# Patient Record
Sex: MALE | Race: WHITE
[De-identification: names, ages, dates, MRNs, and addresses within clinical notes are randomized per-mention and may not be internally consistent; named-entity substitution may affect disease eponyms.]

---

## 2017-03-01 ENCOUNTER — HOSPITAL ENCOUNTER (OUTPATIENT)
Dept: HOSPITAL 80 - FED | Age: 72
Setting detail: OBSERVATION
LOS: 1 days | Discharge: HOME HEALTH | End: 2017-03-02
Attending: INTERNAL MEDICINE | Admitting: INTERNAL MEDICINE
Payer: COMMERCIAL

## 2017-03-01 VITALS — RESPIRATION RATE: 18 BRPM

## 2017-03-01 DIAGNOSIS — F17.210: ICD-10-CM
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
## 2017-03-01 DIAGNOSIS — Z85.46: ICD-10-CM

## 2017-03-01 DIAGNOSIS — J44.9: ICD-10-CM

## 2017-03-01 DIAGNOSIS — Z91.5: ICD-10-CM

## 2017-03-01 DIAGNOSIS — J18.9: Primary | ICD-10-CM

## 2017-03-01 DIAGNOSIS — Z85.830: ICD-10-CM

## 2017-03-01 DIAGNOSIS — F11.21: ICD-10-CM

## 2017-03-01 DIAGNOSIS — S22.000A: ICD-10-CM

## 2017-03-01 DIAGNOSIS — F10.20: ICD-10-CM
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
## 2017-03-01 DIAGNOSIS — I10: ICD-10-CM

## 2017-03-01 DIAGNOSIS — F03.90: ICD-10-CM

## 2017-03-01 LAB
% IMMATURE GRANULYOCYTES: 0.9 % (ref 0–1.1)
ABSOLUTE IMMATURE GRANULOCYTES: 0.08 10^3/UL (ref 0–0.1)
ABSOLUTE NRBC COUNT: 0 10^3/UL (ref 0–0.01)
ADD DIFF?: NO
ADD MORPH?: NO
ADD SCAN?: NO
ANION GAP SERPL CALC-SCNC: 10 MEQ/L (ref 8–16)
ATYPICAL LYMPHOCYTE FLAG: 0 (ref 0–99)
CALCIUM SERPL-MCNC: 9.2 MG/DL (ref 8.5–10.4)
CHLORIDE SERPL-SCNC: 100 MEQ/L (ref 97–110)
CO2 SERPL-SCNC: 30 MEQ/L (ref 22–31)
CREAT SERPL-MCNC: 0.8 MG/DL (ref 0.7–1.3)
ERYTHROCYTE [DISTWIDTH] IN BLOOD BY AUTOMATED COUNT: 13.6 % (ref 11.5–15.2)
ETHANOL SERPL-MCNC: 69 MG/DL (ref 0–10)
FRAGMENT RBC FLAG: 0 (ref 0–99)
GFR SERPL CREATININE-BSD FRML MDRD: > 60 ML/MIN/{1.73_M2}
GLUCOSE SERPL-MCNC: 78 MG/DL (ref 70–100)
HCT VFR BLD CALC: 36.9 % (ref 40–51)
HGB BLD-MCNC: 12.8 G/DL (ref 13.7–17.5)
LEFT SHIFT FLG: 0 (ref 0–99)
LIPEMIA HEMOLYSIS FLAG: 90 (ref 0–99)
MCH RBC BLDCO QN: 36.1 PG (ref 27.9–34.1)
MCHC RBC AUTO-ENTMCNC: 34.7 G/DL (ref 32.4–36.7)
MCV RBC AUTO: 103.9 FL (ref 81.5–99.8)
NRBC-AUTO%: 0 % (ref 0–0.2)
PLATELET # BLD: 367 10^3/UL (ref 150–400)
PLATELET CLUMPS FLAG: 0 (ref 0–99)
PMV BLD AUTO: 9.5 FL (ref 8.7–11.7)
POTASSIUM SERPL-SCNC: 3.8 MEQ/L (ref 3.5–5.2)
RBC # BLD AUTO: 3.55 10^6/UL (ref 4.4–6.38)
SODIUM SERPL-SCNC: 140 MEQ/L (ref 134–144)
TROPONIN I SERPL-MCNC: < 0.012 NG/ML (ref 0–0.03)

## 2017-03-01 PROCEDURE — 99285 EMERGENCY DEPT VISIT HI MDM: CPT

## 2017-03-01 PROCEDURE — 70450 CT HEAD/BRAIN W/O DYE: CPT

## 2017-03-01 PROCEDURE — 96365 THER/PROPH/DIAG IV INF INIT: CPT

## 2017-03-01 PROCEDURE — 93005 ELECTROCARDIOGRAM TRACING: CPT

## 2017-03-01 PROCEDURE — 96361 HYDRATE IV INFUSION ADD-ON: CPT

## 2017-03-01 PROCEDURE — 96367 TX/PROPH/DG ADDL SEQ IV INF: CPT

## 2017-03-01 PROCEDURE — G0378 HOSPITAL OBSERVATION PER HR: HCPCS

## 2017-03-01 PROCEDURE — 97165 OT EVAL LOW COMPLEX 30 MIN: CPT

## 2017-03-01 PROCEDURE — 71020: CPT

## 2017-03-01 PROCEDURE — G0480 DRUG TEST DEF 1-7 CLASSES: HCPCS

## 2017-03-01 RX ADMIN — GABAPENTIN SCH MG: 300 CAPSULE ORAL at 18:16

## 2017-03-01 RX ADMIN — IPRATROPIUM BROMIDE AND ALBUTEROL SCH: 20; 100 SPRAY, METERED RESPIRATORY (INHALATION) at 22:19

## 2017-03-01 RX ADMIN — GABAPENTIN SCH MG: 300 CAPSULE ORAL at 21:58

## 2017-03-01 RX ADMIN — ALUMINUM HYDROXIDE, MAGNESIUM HYDROXIDE, DIMETHICONE PRN ML: 200; 200; 20 SUSPENSION ORAL at 22:05

## 2017-03-01 NOTE — EDPHY
H & P


Time Seen by Provider: 03/01/17 08:31


HPI/ROS: 





HPI


Fall out of bed.





71-year-old male by ambulance from the [facility] living [facility] and the [facility].  

Unwitnessed fall last night out of bed.  undetermined down time.  Patient found 

on floor.  Patient was complaining of some vague upper back pain involving both 

shoulder blades.  Denies chest pain. No palpitations.  He does not remember the 

fall.  He is thinks he hit his head but he is not sure.  He denies being on 

antiplatelet or anticoagulant medications at this time.  Denies any new 

weakness or loss of sensation in his extremities.  No changes in vision.





ROS:





Constitutional:  No fever, no chills.  No weakness.


Eyes:  No discharge.  No changes in vision.


ENT:  No sore throat.  No nasal congestion or rhinorrhea.


Respiratory:  No cough.  No shortness of breath.


Cardiac:  No chest pain, no palpitations.


Gastrointestinal:  No abdominal pain, no vomiting, no diarrhea.


Genitourinary:  No hematuria.  No dysuria or increased frequency with urination.


Musculoskeletal:  No back pain.  No neck pain. As above.  No extremity pain.


Skin:  No rashes.


Neurological:  No headache.  No focal weakness or altered sensation.





Past medical history:  Chronic respiratory disease, chronic pain, history of 

bone cancer, hypertension, lifetime smoker.  Narcotic caution on previous 

medical records.





Social history:  Smoker.  Denies alcohol.





Physical Exam:





General Appearance:  Sleepy but easily arousable.  This patient is responding 

to questions appropriately and in full sentences.  This patient appears well-

hydrated and well-nourished.


Head:  Normocephalic atraumatic except for a superficial abrasion mid upper 

forehead..


Face:  Facial bones are stable on palpation.


Eyes:  Pupils equal and round and reactive to light, no pallor or injection.  

No lid erythema or edema.


ENT, Mouth:  Mucous membranes dry.  Dentition is intact.  No malocclusion of 

the jaw.  No tongue lacerations or abrasions.  Pharynx is clear.  The bilateral 

nasal canals are clear.  No septal hematoma.


Respiratory:  There are no retractions, lungs are clear to auscultation with 

good air movement bilaterally.  Chest wall is stable to AP and lateral 

palpation.


Cardiovascular:  Regular rate and rhythm.  No murmur.


Gastrointestinal:  Abdomen is soft and nontender, no masses, bowel sounds 

normal.


Neurological:  Motor sensory function is intact.  Cranial nerves are normal.  

Cerebellar function intact.


Skin:  Warm and dry, no rashes.  No lacerations, abrasions or contusions.


Musculoskeletal:  Neck is supple and nontender.  The trachea is midline. 

Rightward scoliosis noted.  No midline cervical, thoracic, lumbar or sacral 

tenderness on palpation.  No flank tenderness on palpation.


Extremities are symmetrical, full range of motion.  All joints in the bilateral 

upper and bilateral lower extremities range without pain or impingement.  No 

tenderness on palpation of the long bones in the bilateral upper and bilateral 

lower extremities.


Psychiatric:  No agitation.  No depression.





Database:





EKG:





EKG time is 8:46 a.m.; EKG shows a narrow complex normal sinus rhythm with a 

ventricular rate of 61.  The MN, QRS, QT intervals are within normal limits.  

There are no ST-T wave changes indicative of ischemic or injury pattern.  No 

evidence of right heart strain.  Interpreted by me.





Imaging:





Chest x-ray PA and lateral; the cardiac mediastinal silhouette is unremarkable. 

Left-sided basilar pneumonia versus atelectasis.  New mid thoracic compression 

fractures since November of 2016.  No evidence pneumothorax.  No other acute 

cardiopulmonary disease process noted.  Interpreted by me.





CT scan of head without contrast; atrophy.  No acute pathology.  Results were 

discussed with staff radiologist.





Procedures:





Emergency department course:





IV was placed.  He was placed on a cardiac monitor.  EKG was performed and 

reviewed by myself.  He will be sent for CT imaging of his head.  Blood work 

including CK will be checked.  Patient will be hydrated with a L of IV normal 

saline initially.  Vital signs reviewed and are normal.





11:15 a.m., discussed results of chest x-ray with the patient and his nurses.  

Will treat for possible pneumonia.  Blood cultures drawn.  Patient given IV 

ceftriaxone and IV azithromycin.  He does not meet SIRS criteria and sepsis.





11:35 a.m., spoke with hospitalist.  Case discussed in detail.  Patient 

accepted for admission under Dr. Mensah.





Differential Diagnosis:





The differential diagnosis on this patient includes but is not limited to 

dehydration, opiate pain medication abuse, alcohol intoxication, rhabdomyolysis

, head injury.  This represents a partial list of diagnoses considered.  These 

considerations are based on history, physical exam, past history, reassessment 

and diagnostic testing.


Smoking Status: Current every day smoker


Constitutional: 


 Initial Vital Signs











Temperature (C)  36.4 C   03/01/17 08:28


 


Heart Rate  76   03/01/17 08:28


 


Respiratory Rate  18   03/01/17 08:28


 


Blood Pressure  159/76 H  03/01/17 08:28


 


O2 Sat (%)  94   03/01/17 08:28








 











O2 Delivery Mode               Nasal Cannula


 


O2 (L/minute)                  4














Allergies/Adverse Reactions: 


 





Sulfa (Sulfonamide Antibiotics) [Sulfa(Sulfonamide Antibiotics)] Allergy (

Verified 11/10/16 13:15)


 








Home Medications: 














 Medication  Instructions  Recorded


 


Albuterol [Proventil Inhaler HFA 2 puffs IH Q4H PRN 03/01/17





(*)]  


 


Calcium Carbonate [Tums 500MG (*)] 500 mg PO PRN PRN 03/01/17


 


Cyclobenzaprine [Flexeril 10 MG 10 mg PO TID PRN 03/01/17





(*)]  


 


DULoxetine [Cymbalta 60 MG (*)] 60 mg PO DAILY 03/01/17


 


Gabapentin [Neurontin 300 MG (*)] 300 mg PO TID 03/01/17


 


Naproxen Sodium [Aleve 220 MG (*)] 220 mg PO QID PRN 03/01/17


 


Omeprazole 40 mg PO DAILY 03/01/17


 


Temazepam [Restoril 15 MG (*)] 30 mg PO HS PRN 03/01/17














Medical Decision Making





- Data Points


Laboratory Results: 


 Laboratory Results





 03/01/17 08:50 





 03/01/17 08:50 








Medications Given: 


 








Discontinued Medications





Albuterol/Ipratropium (Duoneb)  3 ml IH QID GREGG


   Stop: 08/28/17 15:59


   Last Admin: 03/01/17 16:32 Dose:  Not Given


Sodium Chloride (Ns)  1,000 mls @ 0 mls/hr IV EDNOW ONE


   PRN Reason: Wide Open


   Stop: 03/01/17 08:40


   Last Admin: 03/01/17 08:50 Dose:  1,000 mls


Sodium Chloride (Ns)  1,000 mls @ 0 mls/hr IV ONCE ONE


   PRN Reason: Wide Open


   Stop: 03/01/17 09:35


   Last Admin: 03/01/17 09:45 Dose:  1,000 mls


Azithromycin 500 mg/ Dextrose  255 mls @ 255 mls/hr IV EDNOW ONE


   PRN Reason: Protocol


   Stop: 03/01/17 12:16


   Last Admin: 03/01/17 12:03 Dose:  255 mls


Ceftriaxone Sodium/Dextrose (Rocephin 1 Gm (Premix))  50 mls @ 100 mls/hr IV 

EDNOW ONE


   PRN Reason: Protocol


   Stop: 03/01/17 11:46


   Last Admin: 03/01/17 11:33 Dose:  50 mls








Departure





- Departure


Disposition: [facility]


Clinical Impression: 


 Dehydration, History of fall, Pneumonia, Thoracic compression fracture, 

Polysubstance abuse

## 2017-03-01 NOTE — GHP
DATE OF ADMISSION:  03/01/2017



CHIEF COMPLAINT:  "I do not know why I am here."



HISTORY:  This is a 71-year-old man with past medical history of chronic alcohol abuse as well as pr
evious chronic narcotic and benzodiazepine dependency and COPD who presents with a history of having
 fallen out of bed at his assisted living facility.  Apparently he was found on the floor.  At the t
oralia my evaluation, patient has no recollection of falling.  He states he has no idea how he ended up
 here in the hospital.  He notes this is normal for him, as his memory has been getting worse recent
ly.  He was found to have evidence of pneumonia while in the ER and is being admitted for treatment 
of pneumonia, which was explained to him.  He, at this time, other than confusion as to why he is he
re, has no complaints.



PAST MEDICAL HISTORY:  Includes:

1.  Chronic alcohol abuse.

2.  Chronic narcotic and benzodiazepine use, which is now no longer occurring.

3.  COPD.

4.  History of prostate cancer with bony metastases.

5.  Prior aspiration pneumonia.

6.  Prior depression with suicide attempts.

7.  Cervical spinal stenosis.



FAMILY HISTORY:  This was reviewed, and per chart review, includes a cousin with prostate cancer.



SOCIAL HISTORY:  The patient lives at [facility].  He denies having any family i
[facility], but does have an MD POA listed named Ed.  Has remote tobacco use, and notes he is currently 
using alcohol.  For that reason, he was taken off his narcotics and benzos.



ADVANCED DIRECTIVE:  Last advanced directive in our system states that he is full code, but would li
ke care withdrawn should he fail to progress.  He does have a palliative care note, however, subsequ
ent to that, saying that he is DNR.  This is clearly documented, and was verified by his MD POA, and
 this will be documented as his goals of care.



REVIEW OF SYSTEMS:  This is unobtainable secondary to patient's mental status, though he denies any 
complaints.



MEDICATIONS:  Include:

1.  Calcium carbonate.

2.  Temazepam.

3.  Omeprazole.

4.  Gabapentin.

5.  Cymbalta.

6.  Flexeril.

7.  Naproxen.

8.  Albuterol.



ALLERGIES:  Include sulfa.



PHYSICAL EXAMINATION:  VITAL SIGNS:  /76, heart rate 68, respiratory rate 20, O2 sats 95% on 4
 L.  Temperature is 36.8.  GENERAL APPEARANCE:  This is a chronically ill-appearing male, appears ol
kurtis than stated age.  He is awake and alert.  No acute distress.  EYES:  Anicteric.  HEENT:  Poor de
ntition, oropharynx clear. 

CARDIOVASCULAR:  Regular rate and rhythm, no MRG.  PULMONARY:  Decreased breath sounds throughout.  
He does have left basilar crackles.  ABDOMEN:  Soft, nontender, positive bowel sounds.  EXTREMITIES:
  No clubbing, cyanosis, or edema.  SKIN:  Warm, dry, well perfused.  NEURO/PSYCH:  Patient has sign
ificant cognitive deficits and very poor memory, but is alert and appropriate.



CLINICAL DATA:  Labs reviewed, significant for white blood cell count of 8.9, hematocrit 36.9, plate
lets of 367.  Chemistry is unremarkable.  Troponin is less than 0.012. 



Chest x-ray shows left basilar pneumonia versus left basilar atelectasis.  This was personally revie
wed. 



Head CT is negative for any acute findings.



ASSESSMENT/PLAN:  This is a 71-year-old man with chronic alcohol abuse as well as chronic obstructiv
e pulmonary disease who presents with unwitnessed fall and presumed pneumonia.

1.  Pneumonia.  The patient is a very poor historian, though he does have mild hypoxia and evidence 
of a left-sided infiltrate on chest x-ray, which is atelectasis versus pneumonia.  He has been start
ed on ceftriaxone and azithromycin.  We will transition him to oral levofloxacin tomorrow given that
 I suspect this is a relatively mild pneumonia.  Will check influenza swab.  

2.  Acute hypoxic respiratory failure.  The patient is requiring 4 L of O2 to maintain oxygen satura
tions in the low 90s.  He does have underlying chronic obstructive pulmonary disease, but no clear e
vidence of acute exacerbation.  It sounds as if his mobility is quite limited at baseline, atelectas
is undoubtedly contributing.  Ambulate t.i.d., IS, p.r.n. albuterol nebs.  Suspect some of this is l
ikely chronic.

3.  Chronic alcohol abuse.  Apparently patient recently had his narcotic contract severed with his [facility] management doctor due to his ongoing alcohol abuse.  I did discuss with him if he intends to patsy
t drinking, and he was able to state that he is not.  He is definitely at risk for withdrawal, has h
ad withdrawal in the past.  We will start p.r.n. vodka and monitor for signs or symptoms of withdraw
al.  We will provide multivitamin, thiamine, and folate.

4.  History of prostate cancer.  Some discussion in the chart whether or not this was ever an accura
te diagnosis, but apparently he has had history of prostate cancer with bony metastases.  At any rat
e, it does not appear that he is currently undergoing treatment, and this is not contributing to his
 acute presentation.  

5.  Code status.  The patient is too confused to have this conversation.  On chart review, there are
 a couple of contradictory notes, although the most recent note stated that he should be DNR, and th
is was confirmed by his MD ALLISON, Ed.  Will ask Palliative to get involved, as there are some notes al
so on the chart from the patient that sound as if he does not want any medical care and query if he 
should be readmitted to the hospital in the future.

6.  Patient is new to my care.  Old records reviewed.  Summary is as per HPI and past medical histor
y.  Care plan reviewed with ER physician, including plans for treatment of pneumonia.





Job #:  [id number]

## 2017-03-01 NOTE — CPEKG
Heart Rate: 61

RR Interval: 984

P-R Interval: 176

QRSD Interval: 92

QT Interval: 492

QTC Interval: 496

P Axis: 3

QRS Axis: 6

T Wave Axis: 0

EKG Severity - BORDERLINE ECG -

EKG Impression: SINUS RHYTHM

EKG Impression: BORDERLINE PROLONGED QT INTERVAL

Electronically Signed By: McCollester, Laughlin 01-Mar-2017 15:00:12

## 2017-03-02 VITALS
TEMPERATURE: 99 F | OXYGEN SATURATION: 94 % | DIASTOLIC BLOOD PRESSURE: 80 MMHG | HEART RATE: 95 BPM | SYSTOLIC BLOOD PRESSURE: 129 MMHG

## 2017-03-02 LAB
% IMMATURE GRANULYOCYTES: 0.6 % (ref 0–1.1)
ABSOLUTE IMMATURE GRANULOCYTES: 0.06 10^3/UL (ref 0–0.1)
ABSOLUTE NRBC COUNT: 0 10^3/UL (ref 0–0.01)
ADD DIFF?: NO
ADD MORPH?: NO
ADD SCAN?: NO
ANION GAP SERPL CALC-SCNC: 6 MEQ/L (ref 8–16)
ATYPICAL LYMPHOCYTE FLAG: 10 (ref 0–99)
CALCIUM SERPL-MCNC: 7.5 MG/DL (ref 8.5–10.4)
CHLORIDE SERPL-SCNC: 111 MEQ/L (ref 97–110)
CO2 SERPL-SCNC: 26 MEQ/L (ref 22–31)
CREAT SERPL-MCNC: 0.6 MG/DL (ref 0.7–1.3)
ERYTHROCYTE [DISTWIDTH] IN BLOOD BY AUTOMATED COUNT: 14.1 % (ref 11.5–15.2)
FRAGMENT RBC FLAG: 0 (ref 0–99)
GFR SERPL CREATININE-BSD FRML MDRD: > 60 ML/MIN/{1.73_M2}
GLUCOSE SERPL-MCNC: 85 MG/DL (ref 70–100)
HCT VFR BLD CALC: 29.5 % (ref 40–51)
HGB BLD-MCNC: 10.3 G/DL (ref 13.7–17.5)
LEFT SHIFT FLG: 0 (ref 0–99)
LIPEMIA HEMOLYSIS FLAG: 90 (ref 0–99)
MAGNESIUM SERPL-MCNC: 1.6 MG/DL (ref 1.6–2.3)
MCH RBC BLDCO QN: 36.4 PG (ref 27.9–34.1)
MCHC RBC AUTO-ENTMCNC: 34.9 G/DL (ref 32.4–36.7)
MCV RBC AUTO: 104.2 FL (ref 81.5–99.8)
NRBC-AUTO%: 0 % (ref 0–0.2)
PLATELET # BLD: 291 10^3/UL (ref 150–400)
PLATELET CLUMPS FLAG: 0 (ref 0–99)
PMV BLD AUTO: 9.2 FL (ref 8.7–11.7)
POTASSIUM SERPL-SCNC: 3.4 MEQ/L (ref 3.5–5.2)
RBC # BLD AUTO: 2.83 10^6/UL (ref 4.4–6.38)
SODIUM SERPL-SCNC: 143 MEQ/L (ref 134–144)

## 2017-03-02 RX ADMIN — IPRATROPIUM BROMIDE AND ALBUTEROL SCH: 20; 100 SPRAY, METERED RESPIRATORY (INHALATION) at 06:43

## 2017-03-02 RX ADMIN — IPRATROPIUM BROMIDE AND ALBUTEROL SCH: 20; 100 SPRAY, METERED RESPIRATORY (INHALATION) at 13:27

## 2017-03-02 RX ADMIN — ALUMINUM HYDROXIDE, MAGNESIUM HYDROXIDE, DIMETHICONE PRN ML: 200; 200; 20 SUSPENSION ORAL at 13:42

## 2017-03-02 RX ADMIN — GABAPENTIN SCH MG: 300 CAPSULE ORAL at 10:32

## 2017-03-09 ENCOUNTER — HOSPITAL ENCOUNTER (INPATIENT)
Dept: HOSPITAL 80 - FED | Age: 72
LOS: 2 days | Discharge: HOME | DRG: 177 | End: 2017-03-11
Attending: INTERNAL MEDICINE | Admitting: INTERNAL MEDICINE
Payer: COMMERCIAL

## 2017-03-09 DIAGNOSIS — S22.050A: ICD-10-CM

## 2017-03-09 DIAGNOSIS — G92: ICD-10-CM

## 2017-03-09 DIAGNOSIS — J96.21: ICD-10-CM

## 2017-03-09 DIAGNOSIS — I10: ICD-10-CM
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
## 2017-03-09 DIAGNOSIS — F10.10: ICD-10-CM

## 2017-03-09 DIAGNOSIS — F13.10: ICD-10-CM

## 2017-03-09 DIAGNOSIS — S22.42XA: ICD-10-CM

## 2017-03-09 DIAGNOSIS — I71.2: ICD-10-CM

## 2017-03-09 DIAGNOSIS — C79.51: ICD-10-CM

## 2017-03-09 DIAGNOSIS — Z66: ICD-10-CM

## 2017-03-09 DIAGNOSIS — R62.7: ICD-10-CM

## 2017-03-09 DIAGNOSIS — J44.1: ICD-10-CM

## 2017-03-09 DIAGNOSIS — J69.0: Primary | ICD-10-CM

## 2017-03-09 DIAGNOSIS — F11.20: ICD-10-CM

## 2017-03-09 DIAGNOSIS — M48.02: ICD-10-CM

## 2017-03-09 DIAGNOSIS — F17.210: ICD-10-CM

## 2017-03-09 LAB
% IMMATURE GRANULYOCYTES: 0.2 % (ref 0–1.1)
ABSOLUTE IMMATURE GRANULOCYTES: 0.02 10^3/UL (ref 0–0.1)
ABSOLUTE NRBC COUNT: 0 10^3/UL (ref 0–0.01)
ADD DIFF?: NO
ADD MORPH?: NO
ADD SCAN?: NO
ALBUMIN SERPL-MCNC: 2.9 G/DL (ref 3.5–5)
ALP SERPL-CCNC: 115 IU/L (ref 38–126)
ALT SERPL-CCNC: 26 IU/L (ref 21–72)
ANION GAP SERPL CALC-SCNC: 8 MEQ/L (ref 8–16)
APTT BLD: 33 SEC (ref 23–38)
AST SERPL-CCNC: 25 IU/L (ref 17–59)
ATYPICAL LYMPHOCYTE FLAG: 0 (ref 0–99)
BILIRUB SERPL-MCNC: 0.7 MG/DL (ref 0.1–1.4)
BILIRUBIN-CONJUGATED: 0.4 MG/DL (ref 0–0.5)
BILIRUBIN-UNCONJUGATED: 0.3 MG/DL (ref 0–1.1)
CALCIUM SERPL-MCNC: 9.3 MG/DL (ref 8.5–10.4)
CHLORIDE SERPL-SCNC: 105 MEQ/L (ref 97–110)
CO2 SERPL-SCNC: 28 MEQ/L (ref 22–31)
CREAT SERPL-MCNC: 0.8 MG/DL (ref 0.7–1.3)
CREATINE KINASE-MB FRACTION: 1.76 NG/ML (ref 0–3.19)
ERYTHROCYTE [DISTWIDTH] IN BLOOD BY AUTOMATED COUNT: 13.8 % (ref 11.5–15.2)
ETHANOL SERPL-MCNC: 32 MG/DL (ref 0–10)
FRAGMENT RBC FLAG: 0 (ref 0–99)
GFR SERPL CREATININE-BSD FRML MDRD: > 60 ML/MIN/{1.73_M2}
GLUCOSE SERPL-MCNC: 82 MG/DL (ref 70–100)
HCT VFR BLD CALC: 31.1 % (ref 40–51)
HGB BLD-MCNC: 10.8 G/DL (ref 13.7–17.5)
INR PPP: 1.27 (ref 0.83–1.16)
LEFT SHIFT FLG: 0 (ref 0–99)
LIPEMIA HEMOLYSIS FLAG: 90 (ref 0–99)
MAGNESIUM SERPL-MCNC: 1.6 MG/DL (ref 1.6–2.3)
MAGNESIUM SERPL-MCNC: 1.6 MG/DL (ref 1.6–2.3)
MCH RBC BLDCO QN: 35.9 PG (ref 27.9–34.1)
MCHC RBC AUTO-ENTMCNC: 34.7 G/DL (ref 32.4–36.7)
MCV RBC AUTO: 103.3 FL (ref 81.5–99.8)
NRBC-AUTO%: 0 % (ref 0–0.2)
PLATELET # BLD: 261 10^3/UL (ref 150–400)
PLATELET CLUMPS FLAG: 0 (ref 0–99)
PMV BLD AUTO: 9.9 FL (ref 8.7–11.7)
POTASSIUM SERPL-SCNC: 3.4 MEQ/L (ref 3.5–5.2)
POTASSIUM SERPL-SCNC: 3.6 MEQ/L (ref 3.5–5.2)
POTASSIUM SERPL-SCNC: 3.7 MEQ/L (ref 3.5–5.2)
PROT SERPL-MCNC: 6 G/DL (ref 6.3–8.2)
PROTHROMBIN TIME: 15.9 SEC (ref 12–15)
RBC # BLD AUTO: 3.01 10^6/UL (ref 4.4–6.38)
SODIUM SERPL-SCNC: 141 MEQ/L (ref 134–144)
TROPONIN I SERPL-MCNC: < 0.012 NG/ML (ref 0–0.03)

## 2017-03-09 PROCEDURE — G0480 DRUG TEST DEF 1-7 CLASSES: HCPCS

## 2017-03-09 RX ADMIN — GABAPENTIN SCH MG: 300 CAPSULE ORAL at 14:26

## 2017-03-09 RX ADMIN — GABAPENTIN SCH MG: 300 CAPSULE ORAL at 22:15

## 2017-03-09 RX ADMIN — TAZOBACTAM SODIUM AND PIPERACILLIN SODIUM SCH MLS: 375; 3 INJECTION, SOLUTION INTRAVENOUS at 12:44

## 2017-03-09 RX ADMIN — CYCLOBENZAPRINE HYDROCHLORIDE PRN MG: 10 TABLET, FILM COATED ORAL at 14:26

## 2017-03-09 RX ADMIN — ACETAMINOPHEN PRN MG: 325 TABLET ORAL at 19:18

## 2017-03-09 RX ADMIN — TAZOBACTAM SODIUM AND PIPERACILLIN SODIUM SCH MLS: 375; 3 INJECTION, SOLUTION INTRAVENOUS at 18:03

## 2017-03-09 RX ADMIN — ENOXAPARIN SODIUM SCH MG: 100 INJECTION SUBCUTANEOUS at 10:05

## 2017-03-09 NOTE — HOSPPROG
Hospitalist Progress Note


Assessment/Plan: 





H&P reviewed,  patient seen a examined.    Agree with plan as per Dr. Fraga.


I will follow-up in am.








Objective: 


 Vital Signs











Temp Pulse Resp BP Pulse Ox


 


 36.6 C   61   16   149/81 H  96 


 


 03/09/17 13:48  03/09/17 13:48  03/09/17 13:48  03/09/17 13:48  03/09/17 13:48








 











 03/08/17 03/09/17 03/10/17





 05:59 05:59 05:59


 


Intake Total   500


 


Balance   500








 











PT  15.9 SEC (12.0-15.0)  H  03/09/17  03:30    


 


INR  1.27  (0.83-1.16)  H  03/09/17  03:30    














ICD10 Worksheet


Patient Problems: 


 Problems











Problem Status Onset


 


Failure to thrive Acute  


 


Generalized weakness Acute  


 


Recurrent falls Acute  


 


Acute encephalopathy Acute  


 


Acute hyperactive alcohol withdrawal delirium Acute  


 


Altered mental status Acute  


 


Aspiration into airway Acute  


 


Chronic pain Acute  


 


Dehydration Acute  


 


History of fall Acute  


 


Opiate dependence, continuous Acute  


 


Palliative care encounter Acute  


 


Pancreatitis Acute  


 


Pneumonia Acute  


 


Pneumonia Acute  


 


Polypharmacy Acute  


 


Polysubstance abuse Acute  


 


Polysubstance dependence including opioid drug with daily use Acute  


 


Rib fractures Acute  


 


Thoracic compression fracture Acute

## 2017-03-09 NOTE — GHP
[f 
rep st]



                                                            HISTORY AND PHYSICAL





DATE OF ADMISSION:  03/09/2017



CHIEF COMPLAINT:  Falls.



HISTORY OF PRESENT ILLNESS:  This is a 71-year-old man with a history of 
longstanding alcohol abuse as well as chronic narcotic and benzodiazepine 
dependency and dementia, who presents status post multiple falls at home.  
Apparently, the fire department had to come to his house several times in order 
to help him off the ground.  The fire department ultimately became concerned 
about his well being given horrific disarray of his living situation, and 
contacted EMS to have him brought to the emergency room.  He was recently seen 
by myself in this hospital with a left lower lobe pneumonia, and at that time 
despite concerns that he was having failure to thrive, he felt very strongly 
that he wanted to go home to his cat and was not willing to consider other 
options such as SNF.  At the time of my evaluation, patient is very somnolent 
and only minimally communicative.  He does note that he feels weak and has pain 
in his hips and right flank.  He really is unable to provide any other history 
secondary to his somnolence.



PAST MEDICAL HISTORY:  

1.  Includes chronic alcohol abuse.

2.  Chronic narcotic and benzodiazepine dependency.

3.  Recent hospitalization for left lower lobe pneumonia.

4.  Dementia.

5.  History of prostate cancer with bony metastases.

6.  COPD.

7.  History of aspiration pneumonia.

8.  Prior issues of depression and suicide attempt.

9.  Cervical spine stenosis.



FAMILY HISTORY:  Cousin with prostate cancer.



SOCIAL HISTORY:  The patient has been residing at [facility].  He does have a cat and per EMS, there is cat feces and urine all over 
his home.  He is a daily heavy drinker.  Prior tobacco use.



REVIEW OF SYSTEMS:  A 10-point review of systems obtained.  Negative except as 
per HPI.



HOME MEDICATIONS:  

1.  Thiamine.

2.  Restoril.

3.  Omeprazole.

4.  Naproxen.

5.  Multivitamin.

6.  Gabapentin.

7.  Cymbalta.

8.  Flexeril.

9.  Tums.

10.  Albuterol.

11.  Tylenol.



ALLERGIES:  Sulfa.



PHYSICAL EXAM:  /73, heart rate 57, respiratory rate 20, O2 sats 92% on 5 
L, temperature is 36.5.  GENERAL APPEARANCE:  The patient is chronically ill 
appearing.  He is cachectic, he is somnolent, but arousable.  EYES:  Anicteric.
  HEENT:  Oropharynx clear, poor dentition.  Regular rate and rhythm, no MRG.  
CTA bilaterally to anterior exam.  Decreased breath sounds throughout.  ABDOMEN
:  Soft, nontender.  Positive bowel sounds.  EXTREMITIES:  No clubbing, cyanosis
, or edema.  SKIN:  Warm, dry, well perfused.  NEURO/PSYCH:  Again, patient is 
quite somnolent but arousable.  He moves all 4 extremities.



CLINICAL DATA:  Labs reviewed.  Significant for white blood cell count of 8.8, 
hematocrit of 31.1, platelets of 261.  INR is 1.2.  Chemistry significant for a 
potassium of 3.4, creatinine is 0.8.  Troponin less than 0.012.  LFTs are 
unremarkable. 



Chest x-ray shows persistent basilar consolidation with possible new left mid 
lung zone pneumonia. 

Pelvis x-ray personally reviewed and interpreted, showing nothing acute. 



Chest CT, personally reviewed and interpreted, showing multiple rib fractures 
that appear to be subacute in the left 4th through 11th ribs.  Moderate T6 
compression fracture and increasing versus new infiltrates in the lingular 
right middle lobe and right and left lower lobes with small ground glass 
opacity in the anterior left upper lobe, possible aspiration pneumonia.  There 
is also evidence of cardiomegaly and a stable 4.1 cm ascending thoracic aortic 
aneurysm. 



Head CT:  No acute intracranial abnormalities. 



EKG, personally reviewed and interpreted, shows sinus rhythm without acute 
ischemic changes.



ASSESSMENT/PLAN:  This is a 71-year-old man with a past medical history of 
chronic alcohol abuse, dementia and failure to thrive, presenting status post 
multiple falls at home with multiple rib fractures and worsening bilateral 
pneumonia.

1.  Pneumonia.  This is in the setting of recent hospitalization and multiple 
rib fractures.  The patient is at high risk for aspiration given his chronic 
alcoholism and history of prior aspiration pneumonia.  He had recently been 
treated with a course of levofloxacin, though uncertain if he continued to take 
this medication after discharge from hospital.  He was given vancomycin and 
Zosyn in the emergency department which will be continued for the time being.  
Blood and sputum cultures are pending.

2.  Acute hypoxic respiratory failure, currently requiring 5 L to maintain O2 
sats in the mid 90s in the setting of above as well as acute encephalopathy.  
Treatment as per above as well as bronchodilators as needed.

3.  Acute encephalopathy.  The patient is quite somnolent, possibly related to 
medications given in the emergency department.  He does have underlying dementia
, and this also could represent delirium in the setting of acute illness.  Will 
monitor for improvement.

4.  Alcohol abuse and dependency.  At his last hospitalization several days ago
, patient was clear that he was not interested in quitting.  At this time, 
given his significant worsening, it is unclear if he is able to even make that 
kind of decision.  We will start him on CIWA protocol for now.  Blood alcohol 
level at the time of presentation was 32.  

5.  Multiple rib fractures.  Again, in the setting of frequent falling at home 
and alcoholism.  IS, ambulation three times daily.  

6.  Failure to thrive.  Again, the patient was found in a living situation that 
was noted to be likely not only unsanitary but unsafe.  He has had adult 
protective services involved in the past, and they will likely need to be 
involved again.  PT, OT and case management also will be involved during his 
hospitalization.

7.  Code status.  From prior hospitalizations, he has been noted to be DNR and 
this will be continued.

8.  Disposition:  Inpatient status.  Patient will need greater than 48 hours 
stay for evaluation and management of above.  The patient is new to my care.  
Care plan reviewed with Dr. Thorne in the Emergency Department including plans 
for antibiotics.





Job #:  [id number]

MTDD

## 2017-03-09 NOTE — CPEKG
Heart Rate: 65

RR Interval: 923

P-R Interval: 180

QRSD Interval: 88

QT Interval: 468

QTC Interval: 487

P Axis: 2

QRS Axis: 12

T Wave Axis: 0

EKG Severity - BORDERLINE ECG -

EKG Impression: SINUS RHYTHM

EKG Impression: BORDERLINE PROLONGED QT INTERVAL

Electronically Signed By: Farrukh Steiner 09-Mar-2017 07:29:26

## 2017-03-09 NOTE — EDPHY
H & P


HPI/ROS: 





HPI





CHIEF COMPLAINT:  Generalized weakness, fall





HISTORY OF PRESENT ILLNESS:  This patient is 71-year-old male, he presents 

emergency room by EMS after he had 2 separate falls today.  Fire came to give 

him a lift assist any started complaining of right lateral rib pain and pelvis 

pain.  They contacted EMS and EMS brought him to the emergency room.  He was 

recently here on March 2nd with a left lower lobe pneumonia.  He does have a 

history of dementia, recurrent falls, alcohol abuse, narcotic abuse, benzo 

abuse.  


He presents emergency room tonight stating that he feels globally weak.  He 

does complain of right lateral rib pain also complains of bilateral hip pain. 

Patient was able to ambulate to the EMS stretcher.  But appears very weak.  

Does complain of nausea no chest pain does complain of shortness of breath. 





Past Medical History:  Left lower lobe pneumonia recently diagnosed recently 

hospitalized on March 2nd, dementia, recurrent falls, alcohol abuse, history of 

narcotic benzo use,  oxygen dependent 5 L lives independently at [facility]





Past Surgical History:  No recent surgical history





Social History:  Alcohol use, lives at [facility] independent living size





Family History:Noncontributory

















ROS   


REVIEW OF SYSTEMS:


A comprehensive 10 point review of systems is otherwise negative aside from 

elements mentioned in the history of present illness.








Exam   


Constitutional  appears frail, triage nursing summary reviewed, vital signs 

reviewed, awake/alert. 


Eyes   normal conjunctivae and sclera, EOMI, PERRLA. 


HENT   normal inspection, atraumatic, moist mucus membranes, no epistaxis, neck 

supple/ no meningismus, no raccoon eyes. 


Respiratory  crackles bilateral bases,  normal breath sounds, no respiratory 

distress, no wheezing. 


Cardiovascular   rate normal, regular rhythm, no murmur, no edema, distal 

pulses normal. 


Gastrointestinal   soft, non-tender, no rebound, no guarding, normal bowel 

sounds, no distension, no pulsatile mass. 


Genitourinary   no CVA tenderness. 


Musculoskeletal  no midline vertebral tenderness, full range of motion, no calf 

swelling, no tenderness of extremities, no meningismus, good pulses, 

neurovascularly intact.


Skin   pink, warm, & dry, no rash, skin atraumatic. 


Neurologic   awake, alert and oriented x 3, AAOx3, moves all 4 extremities 

equally, motor intact, sensory intact, CN II-XII intact, normal cerebellar, 

normal vision, normal speech. 


Psychiatric   normal mood/affect. 


Heme/Lymph/Immune   no lymphadenopathy.





Differential Diagnosis:  Includes but is not limited to in a particular order:  

Worsening pneumonia, failure to thrive, dehydration, electrolyte abnormality, 

sepsis, hypoxia





Medical Decision Making:  Plan for this patient's patient had an IV established 

obtain blood work, patient will need an EKG, troponin, chest x-ray pelvis x-ray 

CT scan of his head due to fall.





Re-evaluation:








ED x-ray chest negative for evidence of acute trauma.  There is a left lower 

lobe infiltrate.  Image interpreted by myself.





ED x-ray pelvis:  No evidence of acute trauma specifically no evidence of acute 

pelvic fracture.  Image interpreted myself.





CT scan of the head without IV contrast  The results of the study are head 

without IV contrast are negative for acute traumatic injury The study was read 

by Dr. Perea.  I viewed the images myself on the PACS system.





EKG interpretation by me on record in [employer] system.  Impression time of 

EKG 3:35 a.m., this is sinus rhythm rate of 65 otherwise unremarkable EKG no 

acute ischemic changes.





0421:  re-evaluation this time this patient  is resting comfortably appears 

nontoxic.  Patient need to be admitted for generalized weakness failure to 

thrive recurrent falls.  Left lower lobe pneumonia.  I do not see any acute 

evidence of traumatic injury on his CT scan of his head chest x-ray or pelvis x-

ray.  I do have a great concern about discharging him as he has recurrent falls 

and is injuring himself he does drink alcohol he does appear frail and 

cachectic and was just recently here in the hospital.  I think the best plan 

for this patient to be admitted for generalized weakness failure to thrive.  

Hydration.  Further evaluation of the left lower lobe infiltrate with a CT scan 

of his chest.  And possible higher level of care placement at [facility].   As 

he is only in the independent living facility.


Source: Patient, EMS





- Medical/Surgical History


Hx Asthma: No


Hx Chronic Respiratory Disease: Yes


Hx Diabetes: No


Hx Cardiac Disease: Yes


Hx Renal Disease: No


Hx Cirrhosis: No


Hx Alcoholism: No


Hx HIV/AIDS: No


Hx Splenectomy or Spleen Trauma: No


Other PMH: HX of Bone CA, HTN, is a life time smoker 3/4 ppd now





- Social History


Smoking Status: Current every day smoker


Constitutional: 


 Initial Vital Signs











O2 Sat (%)  93   03/09/17 03:23








 











O2 Delivery Mode               Nasal Cannula


 


O2 (L/minute)                  5














Allergies/Adverse Reactions: 


 





Sulfa (Sulfonamide Antibiotics) [Sulfa(Sulfonamide Antibiotics)] Allergy (

Verified 11/10/16 13:15)


 








Home Medications: 














 Medication  Instructions  Recorded


 


Albuterol [Proventil Inhaler HFA 2 puffs IH Q4H PRN 03/01/17





(*)]  


 


Calcium Carbonate [Tums 500MG (*)] 500 mg PO PRN PRN 03/01/17


 


Cyclobenzaprine [Flexeril 10 MG 10 mg PO TID PRN 03/01/17





(*)]  


 


DULoxetine [Cymbalta 60 MG (*)] 60 mg PO DAILY 03/01/17


 


Gabapentin [Neurontin 300 MG (*)] 300 mg PO TID 03/01/17


 


Naproxen Sodium [Aleve 220 MG (*)] 220 mg PO QID PRN 03/01/17


 


Omeprazole 40 mg PO DAILY 03/01/17


 


Temazepam [Restoril 15 MG (*)] 30 mg PO HS PRN 03/01/17


 


Acetaminophen [Tylenol 325mg (*)] 650 mg PO Q4HRS PRN #0 tab 03/02/17


 


Multivitamins [Multivitamin (*)] 1 each PO DAILY #0 tab 03/02/17


 


Thiamine HCl [Vitamin B-1] 100 mg PO DAILY #0 tab 03/02/17


 


levOFLOXACIN [levAQUIN (*)] 750 mg PO DAILY AT 10AM #5 tab 03/02/17














Medical Decision Making





- Data Points


Laboratory Results: 


 Laboratory Results





 03/09/17 03:30 





 03/09/17 03:30 





 











  03/09/17 03/09/17 03/09/17





  03:30 03:30 03:30


 


WBC      8.86 10^3/uL 10^3/uL





     (3.80-9.50) 


 


RBC      3.01 10^6/uL L 10^6/uL





     (4.40-6.38) 


 


Hgb      10.8 g/dL L g/dL





     (13.7-17.5) 


 


Hct      31.1 % L %





     (40.0-51.0) 


 


MCV      103.3 fL H fL





     (81.5-99.8) 


 


MCH      35.9 pg H pg





     (27.9-34.1) 


 


MCHC      34.7 g/dL g/dL





     (32.4-36.7) 


 


RDW      13.8 % %





     (11.5-15.2) 


 


Plt Count      261 10^3/uL 10^3/uL





     (150-400) 


 


MPV      9.9 fL fL





     (8.7-11.7) 


 


Neut % (Auto)      64.3 % %





     (39.3-74.2) 


 


Lymph % (Auto)      24.0 % %





     (15.0-45.0) 


 


Mono % (Auto)      8.2 % %





     (4.5-13.0) 


 


Eos % (Auto)      2.3 % %





     (0.6-7.6) 


 


Baso % (Auto)      1.0 % %





     (0.3-1.7) 


 


Nucleat RBC Rel Count      0.0 % %





     (0.0-0.2) 


 


Absolute Neuts (auto)      5.69 10^3/uL 10^3/uL





     (1.70-6.50) 


 


Absolute Lymphs (auto)      2.13 10^3/uL 10^3/uL





     (1.00-3.00) 


 


Absolute Monos (auto)      0.73 10^3/uL 10^3/uL





     (0.30-0.80) 


 


Absolute Eos (auto)      0.20 10^3/uL 10^3/uL





     (0.03-0.40) 


 


Absolute Basos (auto)      0.09 10^3/uL 10^3/uL





     (0.02-0.10) 


 


Absolute Nucleated RBC      0.00 10^3/uL 10^3/uL





     (0-0.01) 


 


Immature Gran %      0.2 % %





     (0.0-1.1) 


 


Immature Gran #      0.02 10^3/uL 10^3/uL





     (0.00-0.10) 


 


PT    15.9 SEC H SEC  





    (12.0-15.0)  


 


INR    1.27  H   





    (0.83-1.16)  


 


APTT    33.0 SEC SEC  





    (23.0-38.0)  


 


Sodium  141 mEq/L mEq/L    





   (134-144)   


 


Potassium  3.4 mEq/L L mEq/L    





   (3.5-5.2)   


 


Chloride  105 mEq/L mEq/L    





   ()   


 


Carbon Dioxide  28 mEq/l mEq/l    





   (22-31)   


 


Anion Gap  8 mEq/L mEq/L    





   (8-16)   


 


BUN  4 mg/dL L mg/dL    





   (7-23)   


 


Creatinine  0.8 mg/dL mg/dL    





   (0.7-1.3)   


 


Estimated GFR  > 60     





    


 


Glucose  82 mg/dL mg/dL    





   ()   


 


Calcium  9.3 mg/dL mg/dL    





   (8.5-10.4)   


 


Magnesium  1.6 mg/dL mg/dL    





   (1.6-2.3)   


 


Total Bilirubin  0.7 mg/dL mg/dL    





   (0.1-1.4)   


 


Conjugated Bilirubin  0.4 mg/dL mg/dL    





   (0.0-0.5)   


 


Unconjugated Bilirubin  0.3 mg/dL mg/dL    





   (0.0-1.1)   


 


AST  25 IU/L IU/L    





   (17-59)   


 


ALT  26 IU/L IU/L    





   (21-72)   


 


Alkaline Phosphatase  115 IU/L IU/L    





   ()   


 


Creatine Kinase  98 IU/L IU/L    





   (0-224)   


 


CK-MB (CK-2) Fraction  1.76 ng/mL ng/mL    





   (0-3.19)   


 


Troponin I  < 0.012 ng/mL ng/mL    





   (0-0.034)   


 


NT-Pro-B Natriuret Pep  238 pg/mL H pg/mL    





   (0-125)   


 


Total Protein  6.0 g/dL L g/dL    





   (6.3-8.2)   


 


Albumin  2.9 g/dL L g/dL    





   (3.5-5.0)   


 


Lipase  56.0 IU/L IU/L    





   ()   


 


Ethyl Alcohol  32 mg/dL H mg/dL    





   (0-10)   











Medications Given: 


 








Discontinued Medications





Sodium Chloride (Ns)  500 mls @ 0 mls/hr IV ONCE ONE


   PRN Reason: As Directed


   Stop: 03/09/17 03:24


   Last Admin: 03/09/17 03:45 Dose:  500 mls








Departure





- Departure


Disposition: [facility]


Clinical Impression: 


 Generalized weakness, Recurrent falls





Failure to thrive


Qualifiers:


 Failure to thrive age range: in adult Qualified Code(s): R62.7 - Adult failure 

to thrive





Condition: Fair


Referrals: 


Patient,NotPresent [Primary Care Provider] - As per Instructions

## 2017-03-10 LAB
% IMMATURE GRANULYOCYTES: 0.4 % (ref 0–1.1)
ABSOLUTE IMMATURE GRANULOCYTES: 0.03 10^3/UL (ref 0–0.1)
ABSOLUTE NRBC COUNT: 0 10^3/UL (ref 0–0.01)
ADD DIFF?: NO
ADD MORPH?: NO
ADD SCAN?: NO
ANION GAP SERPL CALC-SCNC: 6 MEQ/L (ref 8–16)
ATYPICAL LYMPHOCYTE FLAG: 0 (ref 0–99)
CALCIUM SERPL-MCNC: 7.8 MG/DL (ref 8.5–10.4)
CHLORIDE SERPL-SCNC: 109 MEQ/L (ref 97–110)
CO2 SERPL-SCNC: 25 MEQ/L (ref 22–31)
CREAT SERPL-MCNC: 0.7 MG/DL (ref 0.7–1.3)
ERYTHROCYTE [DISTWIDTH] IN BLOOD BY AUTOMATED COUNT: 13.9 % (ref 11.5–15.2)
FRAGMENT RBC FLAG: 0 (ref 0–99)
GFR SERPL CREATININE-BSD FRML MDRD: > 60 ML/MIN/{1.73_M2}
GLUCOSE SERPL-MCNC: 83 MG/DL (ref 70–100)
HCT VFR BLD CALC: 29.8 % (ref 40–51)
HGB BLD-MCNC: 10.4 G/DL (ref 13.7–17.5)
LEFT SHIFT FLG: 0 (ref 0–99)
LIPEMIA HEMOLYSIS FLAG: 90 (ref 0–99)
MAGNESIUM SERPL-MCNC: 1.5 MG/DL (ref 1.6–2.3)
MCH RBC BLDCO QN: 36.7 PG (ref 27.9–34.1)
MCHC RBC AUTO-ENTMCNC: 34.9 G/DL (ref 32.4–36.7)
MCV RBC AUTO: 105.3 FL (ref 81.5–99.8)
NRBC-AUTO%: 0 % (ref 0–0.2)
PLATELET # BLD: 229 10^3/UL (ref 150–400)
PLATELET CLUMPS FLAG: 0 (ref 0–99)
PMV BLD AUTO: 10 FL (ref 8.7–11.7)
POTASSIUM SERPL-SCNC: 3.3 MEQ/L (ref 3.5–5.2)
POTASSIUM SERPL-SCNC: 3.9 MEQ/L (ref 3.5–5.2)
RBC # BLD AUTO: 2.83 10^6/UL (ref 4.4–6.38)
SODIUM SERPL-SCNC: 140 MEQ/L (ref 134–144)

## 2017-03-10 RX ADMIN — GABAPENTIN SCH MG: 300 CAPSULE ORAL at 21:31

## 2017-03-10 RX ADMIN — TAZOBACTAM SODIUM AND PIPERACILLIN SODIUM SCH MLS: 375; 3 INJECTION, SOLUTION INTRAVENOUS at 12:18

## 2017-03-10 RX ADMIN — AMOXICILLIN AND CLAVULANATE POTASSIUM SCH MG: 875; 125 TABLET, FILM COATED ORAL at 19:42

## 2017-03-10 RX ADMIN — TEMAZEPAM PRN MG: 15 CAPSULE ORAL at 01:07

## 2017-03-10 RX ADMIN — PANTOPRAZOLE SODIUM SCH MG: 40 TABLET, DELAYED RELEASE ORAL at 08:49

## 2017-03-10 RX ADMIN — ACETAMINOPHEN PRN MG: 325 TABLET ORAL at 19:41

## 2017-03-10 RX ADMIN — CYCLOBENZAPRINE HYDROCHLORIDE PRN MG: 10 TABLET, FILM COATED ORAL at 19:41

## 2017-03-10 RX ADMIN — TAZOBACTAM SODIUM AND PIPERACILLIN SODIUM SCH MLS: 375; 3 INJECTION, SOLUTION INTRAVENOUS at 06:36

## 2017-03-10 RX ADMIN — GABAPENTIN SCH MG: 300 CAPSULE ORAL at 08:49

## 2017-03-10 RX ADMIN — NICOTINE SCH MG: 14 PATCH TRANSDERMAL at 13:45

## 2017-03-10 RX ADMIN — TEMAZEPAM PRN MG: 15 CAPSULE ORAL at 19:41

## 2017-03-10 RX ADMIN — GABAPENTIN SCH MG: 300 CAPSULE ORAL at 17:06

## 2017-03-10 RX ADMIN — FOLIC ACID SCH MG: 1 TABLET ORAL at 08:50

## 2017-03-10 RX ADMIN — ENOXAPARIN SODIUM SCH MG: 100 INJECTION SUBCUTANEOUS at 08:50

## 2017-03-10 RX ADMIN — THERA TABS SCH EACH: TAB at 08:49

## 2017-03-10 RX ADMIN — TAZOBACTAM SODIUM AND PIPERACILLIN SODIUM SCH MLS: 375; 3 INJECTION, SOLUTION INTRAVENOUS at 01:38

## 2017-03-10 NOTE — HOSPPROG
Hospitalist Progress Note


Assessment/Plan: 





*  Aspiration pneumonia


   -change to PO Augmentin


*  COPD with chronic respiratory failure - baseline 4L


*  Etoh abuse


   -no signs of withdrawal


*  Prostate ca with yeny mets


*  Metabolic/toxic encephalopathy - improved


*  Multiple subacute rib fractures due to falls


   -unsafe living situation


   -patient now agreeable to SNF











Subjective: no new complaints.


Objective: 


 Vital Signs











Temp Pulse Resp BP Pulse Ox


 


 36.7 C   79   16   135/69 H  94 


 


 03/10/17 15:06  03/10/17 15:06  03/10/17 15:06  03/10/17 15:06  03/10/17 15:06








 Microbiology











 03/10/17 08:10  - Final





 Sputum, Expectorated 








 Laboratory Results





 03/10/17 05:30 





 03/10/17 05:30 





 











 03/09/17 03/10/17 03/11/17





 05:59 05:59 05:59


 


Intake Total  500 


 


Balance  500 








 











PT  15.9 SEC (12.0-15.0)  H  03/09/17  03:30    


 


INR  1.27  (0.83-1.16)  H  03/09/17  03:30    














- Physical Exam


Constitutional: no apparent distress, appears nourished, not in pain


Cardiovascular: regular rate and rhythym, no murmur, rub, or gallop


Respiratory: no respiratory distress, no rales or rhonchi, clear to auscultation


Gastrointestinal: normoactive bowel sounds, soft, non-tender abdomen, no 

palpable masses


Skin: no rashes or abrasions, no fluctuance, no induration


Neurologic: AAOx3, sensation intact bilaterally


Psychiatric: interacting appropriately, not anxious, not encephalopathic, 

thought process linear





ICD10 Worksheet


Patient Problems: 


 Problems











Problem Status Onset


 


Failure to thrive Acute  


 


Generalized weakness Acute  


 


Recurrent falls Acute  


 


Acute encephalopathy Acute  


 


Acute hyperactive alcohol withdrawal delirium Acute  


 


Altered mental status Acute  


 


Aspiration into airway Acute  


 


Chronic pain Acute  


 


Dehydration Acute  


 


History of fall Acute  


 


Opiate dependence, continuous Acute  


 


Palliative care encounter Acute  


 


Pancreatitis Acute  


 


Pneumonia Acute  


 


Pneumonia Acute  


 


Polypharmacy Acute  


 


Polysubstance abuse Acute  


 


Polysubstance dependence including opioid drug with daily use Acute  


 


Rib fractures Acute  


 


Thoracic compression fracture Acute

## 2017-03-11 VITALS — DIASTOLIC BLOOD PRESSURE: 73 MMHG | SYSTOLIC BLOOD PRESSURE: 143 MMHG | TEMPERATURE: 98.4 F | HEART RATE: 65 BPM

## 2017-03-11 VITALS — OXYGEN SATURATION: 93 % | RESPIRATION RATE: 18 BRPM

## 2017-03-11 LAB
% IMMATURE GRANULYOCYTES: 0.2 % (ref 0–1.1)
ABSOLUTE IMMATURE GRANULOCYTES: 0.02 10^3/UL (ref 0–0.1)
ABSOLUTE NRBC COUNT: 0 10^3/UL (ref 0–0.01)
ADD DIFF?: NO
ADD MORPH?: NO
ADD SCAN?: NO
ANION GAP SERPL CALC-SCNC: 4 MEQ/L (ref 8–16)
ATYPICAL LYMPHOCYTE FLAG: 10 (ref 0–99)
CALCIUM SERPL-MCNC: 8 MG/DL (ref 8.5–10.4)
CHLORIDE SERPL-SCNC: 109 MEQ/L (ref 97–110)
CO2 SERPL-SCNC: 28 MEQ/L (ref 22–31)
CREAT SERPL-MCNC: 0.6 MG/DL (ref 0.7–1.3)
ERYTHROCYTE [DISTWIDTH] IN BLOOD BY AUTOMATED COUNT: 14.1 % (ref 11.5–15.2)
FRAGMENT RBC FLAG: 0 (ref 0–99)
GFR SERPL CREATININE-BSD FRML MDRD: > 60 ML/MIN/{1.73_M2}
GLUCOSE SERPL-MCNC: 110 MG/DL (ref 70–100)
HCT VFR BLD CALC: 29.8 % (ref 40–51)
HGB BLD-MCNC: 10.3 G/DL (ref 13.7–17.5)
LEFT SHIFT FLG: 0 (ref 0–99)
LIPEMIA HEMOLYSIS FLAG: 90 (ref 0–99)
MAGNESIUM SERPL-MCNC: 1.9 MG/DL (ref 1.6–2.3)
MCH RBC BLDCO QN: 36.8 PG (ref 27.9–34.1)
MCHC RBC AUTO-ENTMCNC: 34.6 G/DL (ref 32.4–36.7)
MCV RBC AUTO: 106.4 FL (ref 81.5–99.8)
NRBC-AUTO%: 0 % (ref 0–0.2)
PLATELET # BLD: 214 10^3/UL (ref 150–400)
PLATELET CLUMPS FLAG: 10 (ref 0–99)
PMV BLD AUTO: 9.6 FL (ref 8.7–11.7)
POTASSIUM SERPL-SCNC: 3.9 MEQ/L (ref 3.5–5.2)
RBC # BLD AUTO: 2.8 10^6/UL (ref 4.4–6.38)
SODIUM SERPL-SCNC: 141 MEQ/L (ref 134–144)

## 2017-03-11 RX ADMIN — GABAPENTIN SCH MG: 300 CAPSULE ORAL at 09:37

## 2017-03-11 RX ADMIN — THERA TABS SCH EACH: TAB at 09:37

## 2017-03-11 RX ADMIN — PANTOPRAZOLE SODIUM SCH MG: 40 TABLET, DELAYED RELEASE ORAL at 09:35

## 2017-03-11 RX ADMIN — AMOXICILLIN AND CLAVULANATE POTASSIUM SCH MG: 875; 125 TABLET, FILM COATED ORAL at 09:37

## 2017-03-11 RX ADMIN — FOLIC ACID SCH MG: 1 TABLET ORAL at 09:38

## 2017-03-11 RX ADMIN — NICOTINE SCH MG: 14 PATCH TRANSDERMAL at 09:37

## 2017-03-11 RX ADMIN — ENOXAPARIN SODIUM SCH MG: 100 INJECTION SUBCUTANEOUS at 09:38

## 2017-03-11 RX ADMIN — ENOXAPARIN SODIUM SCH: 100 INJECTION SUBCUTANEOUS at 09:46

## 2017-03-11 NOTE — GDS
[f rep st]



                                                             DISCHARGE SUMMARY





DIAGNOSES:  

1.  Aspiration pneumonia.

2.  Acute chronic obstructive pulmonary disease exacerbation.

3.  Acute on chronic respiratory failure.

4.  Chronic narcotic and benzodiazepine dependency.

5.  Dementia.

6.  History of prostate cancer with bony metastasis.

7.  Depression.

8.  Cervical spine stenosis.



PROCEDURES DONE:  Chest CT, subacute fracture of the left posterior 4th through 11th ribs, moderate 
T6 compression fracture, increasing and/or new infiltrate in the lingular right middle lobe and righ
t left lower lobes, with small ground-glass opacities, cardiomegaly.  Head CT without contrast, no a
cute abnormalities.



HOSPITAL COURSE:  The patient is a 71-year-old man, with the above past medical history, who present
s with falls.  He has a long standing history of alcohol abuse, as well of chronic narcotic and avis
odiazepine dependency and dementia, who has had multiple falls at his independent living apartment. 
 Apparently, his home is in disarray.  However, he has refused any discussion during his hospital st
ay about skilled nursing.  He was started on appropriate antibiotics for aspiration pneumonia, and i
mproved significantly over the course of his hospitalization.  Currently, on the day of discharge, alicia farley is alert and oriented.  He is able to tell me his medications and his living situation.  He is anx
ious to go back to [place] to be with his cat, and refuses any further talk of rehab care.  His ot
her medical issues remain fairly stable.  He is requiring oxygen here in the hospital, and is close 
to baseline, but not quite there.  He is certainly stable enough to return home at this time.



CONDITION ON DISCHARGE:  Guarded.  His vital signs are stable.  He is on anywhere from 4-6 L, satura
ting in the high 90s, afebrile, heart rate 65, blood pressure 143/73.  He is alert and oriented.  Stephie
ngs are clear, but diminished.  There is no edema.  Heart is regular.



DISCHARGE MEDICATIONS:  Please see discharge medication form.



FOLLOWUP:  The patient will be discharged to [facility].  He states he will leave 
[facility] unless he can go home today.  He needs to follow up with his primary care provider early next we
ek.  He is agreeable to this plan, as long as we discharge him today. 



Total time spent on day of discharge and coordination of care, 35 minutes.





Job #:  [id number]

## 2017-04-05 ENCOUNTER — HOSPITAL ENCOUNTER (OUTPATIENT)
Dept: HOSPITAL 80 - BMCIMAGING | Age: 72
End: 2017-04-05
Attending: INTERNAL MEDICINE
Payer: COMMERCIAL

## 2017-04-05 DIAGNOSIS — I70.0: ICD-10-CM

## 2017-04-05 DIAGNOSIS — N28.1: ICD-10-CM

## 2017-04-05 DIAGNOSIS — K82.4: Primary | ICD-10-CM

## 2017-04-17 ENCOUNTER — HOSPITAL ENCOUNTER (EMERGENCY)
Dept: HOSPITAL 80 - FED | Age: 72
Discharge: HOME | End: 2017-04-17
Payer: COMMERCIAL

## 2017-04-17 VITALS
OXYGEN SATURATION: 92 % | SYSTOLIC BLOOD PRESSURE: 118 MMHG | DIASTOLIC BLOOD PRESSURE: 74 MMHG | RESPIRATION RATE: 18 BRPM | TEMPERATURE: 97.3 F | HEART RATE: 57 BPM

## 2017-04-17 DIAGNOSIS — J44.9: ICD-10-CM

## 2017-04-17 DIAGNOSIS — Z85.46: ICD-10-CM

## 2017-04-17 DIAGNOSIS — I10: ICD-10-CM

## 2017-04-17 DIAGNOSIS — F10.120: Primary | ICD-10-CM

## 2017-04-17 DIAGNOSIS — Z85.830: ICD-10-CM

## 2017-04-17 DIAGNOSIS — F17.200: ICD-10-CM

## 2017-04-17 LAB
% IMMATURE GRANULYOCYTES: 0.2 % (ref 0–1.1)
ABSOLUTE IMMATURE GRANULOCYTES: 0.02 10^3/UL (ref 0–0.1)
ABSOLUTE NRBC COUNT: 0 10^3/UL (ref 0–0.01)
ADD DIFF?: NO
ADD MORPH?: NO
ADD SCAN?: NO
ANION GAP SERPL CALC-SCNC: 6 MEQ/L (ref 8–16)
ATYPICAL LYMPHOCYTE FLAG: 0 (ref 0–99)
CALCIUM SERPL-MCNC: 8.1 MG/DL (ref 8.5–10.4)
CHLORIDE SERPL-SCNC: 101 MEQ/L (ref 97–110)
CO2 SERPL-SCNC: 29 MEQ/L (ref 22–31)
CREAT SERPL-MCNC: 0.8 MG/DL (ref 0.7–1.3)
ERYTHROCYTE [DISTWIDTH] IN BLOOD BY AUTOMATED COUNT: 13.9 % (ref 11.5–15.2)
ETHANOL SERPL-MCNC: 101 MG/DL (ref 0–10)
FRAGMENT RBC FLAG: 0 (ref 0–99)
GFR SERPL CREATININE-BSD FRML MDRD: > 60 ML/MIN/{1.73_M2}
GLUCOSE SERPL-MCNC: 79 MG/DL (ref 70–100)
HCT VFR BLD CALC: 34.5 % (ref 40–51)
HGB BLD-MCNC: 11.7 G/DL (ref 13.7–17.5)
LEFT SHIFT FLG: 0 (ref 0–99)
LIPEMIA HEMOLYSIS FLAG: 90 (ref 0–99)
MCH RBC BLDCO QN: 35.1 PG (ref 27.9–34.1)
MCHC RBC AUTO-ENTMCNC: 33.9 G/DL (ref 32.4–36.7)
MCV RBC AUTO: 103.6 FL (ref 81.5–99.8)
NRBC-AUTO%: 0 % (ref 0–0.2)
PLATELET # BLD: 229 10^3/UL (ref 150–400)
PLATELET CLUMPS FLAG: 10 (ref 0–99)
PMV BLD AUTO: 9.6 FL (ref 8.7–11.7)
POTASSIUM SERPL-SCNC: 3.1 MEQ/L (ref 3.5–5.2)
RBC # BLD AUTO: 3.33 10^6/UL (ref 4.4–6.38)
SODIUM SERPL-SCNC: 136 MEQ/L (ref 134–144)

## 2017-04-17 PROCEDURE — G0480 DRUG TEST DEF 1-7 CLASSES: HCPCS

## 2017-04-17 NOTE — EDPHY
H & P


HPI/ROS: 





HPI





CHIEF COMPLAINT:  Alcohol intoxication





HISTORY OF PRESENT ILLNESS:  Patient is a 71-year-old male who is intoxicated 

presents emergency room by ambulance after EMS was called to help give a lift 

assist for this patient at his living facility at [facility].  The patient has 

been drinking liquor all evening.  EMS make contact with him and placed him 

back in his chair however once they were leaving he fell again they decided it 

was to an safe to leave him at his independent living facility brought him to 

the emergency room.





Of note upon arrival here in emergency room the patient smells of  alcohol, he 

is slurring his speech, he is intoxicated.  There is an abrasion to his back 

but otherwise atraumatic exam.  He does appear dehydrated.





Patient has no complaints.





Past Medical History:  Aspiration pneumonia, COPD, dementia, benzo narcotic 

dependency, prostate cancer with bony Mets, depression, alcoholism





Past Surgical History:  No recent surgical history





Social History:  Lives at [facility] independent living





Family History:  Noncontributory








ROS   


REVIEW OF SYSTEMS:


A comprehensive 10 point review of systems is otherwise negative aside from 

elements mentioned in the history of present illness.








Exam   


Constitutional   nontoxic appearing, intoxicated alcohol, smells of alcohol, 

triage nursing summary reviewed, vital signs reviewed, awake/alert. 


Eyes   normal conjunctivae and sclera, EOMI, PERRLA. 


HENT   head/neck:  atraumatic, normocephalic, normal inspection, atraumatic, 

moist mucus membranes, no epistaxis, neck supple/ no meningismus, no raccoon 

eyes. 


Respiratory   clear to auscultation bilaterally, normal breath sounds, no 

respiratory distress, no wheezing. 


Cardiovascular   rate normal, regular rhythm, no murmur, no edema, distal 

pulses normal. 


Gastrointestinal   soft, non-tender, no rebound, no guarding, normal bowel 

sounds, no distension, no pulsatile mass. 


Genitourinary   no CVA tenderness. 


Musculoskeletal  no midline vertebral tenderness, full range of motion, no calf 

swelling, no tenderness of extremities, no meningismus, good pulses, 

neurovascularly intact.


Skin  abrasion to right back, pink, warm, & dry, no rash, skin atraumatic. 


Neurologic   awake, alert and oriented x 3, AAOx3, moves all 4 extremities 

equally, motor intact, sensory intact, CN II-XII intact, normal vision, 

slurring speech. 


Psychiatric   normal mood/affect. 


Heme/Lymph/Immune   no lymphadenopathy.





Differential Diagnosis:  Includes but is not limited to in a particular order 

acute alcohol intoxication, electrolyte disturbance, dehydration, generalized 

weakness, failure to thrive





Medical Decision Making:  Plan for patient IV establishment, gentle hydration, 

check blood work, alcohol level, lytes, monitor for sobriety.





Re-evaluation:








ED x-ray chest one view;  left midlung infiltrate same his old x-ray.  Poor 

inspiratory effort otherwise unremarkable.





0513AM:  Patient ambulated well to the bathroom steady gait.  He does use a 

walker at home.  The further discussion with the patient states would like to 

be discharged from the emergency room you like to go home.  His alcohol level 

was noted to be elevated here but he is now sober.  Steady gait.  Safe for 

discharge.


Source: Patient, EMS





- Medical/Surgical History


Hx Asthma: No


Hx Chronic Respiratory Disease: Yes


Hx Diabetes: No


Hx Cardiac Disease: Yes


Hx Renal Disease: No


Hx Cirrhosis: No


Hx Alcoholism: No


Hx HIV/AIDS: No


Hx Splenectomy or Spleen Trauma: No


Other PMH: HX of Bone CA mets from prostate, HTN, is a life time smoker 3/4 ppd 

now , chronic etoh, narcs and benzos, chronic pain





- Social History


Smoking Status: Current every day smoker


Constitutional: 


 Initial Vital Signs











Heart Rate  72   04/17/17 01:16


 


Respiratory Rate  16   04/17/17 01:16


 


Blood Pressure  94/63 L  04/17/17 01:16


 


O2 Sat (%)  92   04/17/17 01:16








 











O2 Delivery Mode               Nasal Cannula


 


O2 (L/minute)                  4














Allergies/Adverse Reactions: 


 





Sulfa (Sulfonamide Antibiotics) [Sulfa(Sulfonamide Antibiotics)] Allergy (

Verified 11/10/16 13:15)


 








Home Medications: 














 Medication  Instructions  Recorded


 


Albuterol [Proventil Inhaler HFA 2 puffs IH Q4H PRN 03/01/17





(*)]  


 


Calcium Carbonate [Tums 500MG (*)] 500 mg PO PRN PRN 03/01/17


 


Cyclobenzaprine [Flexeril 10 MG 10 mg PO TID PRN 03/01/17





(*)]  


 


DULoxetine [Cymbalta 60 MG (*)] 60 mg PO DAILY 03/01/17


 


Gabapentin [Neurontin 300 MG (*)] 600 mg PO TID 03/01/17


 


Naproxen Sodium [Aleve 220 MG (*)] 220 mg PO QID PRN 03/01/17


 


Omeprazole 40 mg PO DAILY 03/01/17


 


Temazepam [Restoril 15 MG (*)] 30 mg PO HS PRN 03/01/17


 


Acetaminophen [Tylenol 325mg (*)] 650 mg PO Q4HRS PRN #0 tab 03/02/17


 


Amoxicillin/Clavulanate Pot 875 mg PO BID #14 tab 03/11/17





[Augmentin 875 MG TAB (*)]  














Medical Decision Making





- Data Points


Laboratory Results: 


 Laboratory Results





 04/17/17 01:30 





 04/17/17 01:30 





 











  04/17/17 04/17/17





  01:30 01:30


 


WBC    9.73 10^3/uL H 10^3/uL





    (3.80-9.50) 


 


RBC    3.33 10^6/uL L 10^6/uL





    (4.40-6.38) 


 


Hgb    11.7 g/dL L g/dL





    (13.7-17.5) 


 


Hct    34.5 % L %





    (40.0-51.0) 


 


MCV    103.6 fL H fL





    (81.5-99.8) 


 


MCH    35.1 pg H pg





    (27.9-34.1) 


 


MCHC    33.9 g/dL g/dL





    (32.4-36.7) 


 


RDW    13.9 % %





    (11.5-15.2) 


 


Plt Count    229 10^3/uL 10^3/uL





    (150-400) 


 


MPV    9.6 fL fL





    (8.7-11.7) 


 


Neut % (Auto)    61.2 % %





    (39.3-74.2) 


 


Lymph % (Auto)    29.7 % %





    (15.0-45.0) 


 


Mono % (Auto)    6.8 % %





    (4.5-13.0) 


 


Eos % (Auto)    1.5 % %





    (0.6-7.6) 


 


Baso % (Auto)    0.6 % %





    (0.3-1.7) 


 


Nucleat RBC Rel Count    0.0 % %





    (0.0-0.2) 


 


Absolute Neuts (auto)    5.95 10^3/uL 10^3/uL





    (1.70-6.50) 


 


Absolute Lymphs (auto)    2.89 10^3/uL 10^3/uL





    (1.00-3.00) 


 


Absolute Monos (auto)    0.66 10^3/uL 10^3/uL





    (0.30-0.80) 


 


Absolute Eos (auto)    0.15 10^3/uL 10^3/uL





    (0.03-0.40) 


 


Absolute Basos (auto)    0.06 10^3/uL 10^3/uL





    (0.02-0.10) 


 


Absolute Nucleated RBC    0.00 10^3/uL 10^3/uL





    (0-0.01) 


 


Immature Gran %    0.2 % %





    (0.0-1.1) 


 


Immature Gran #    0.02 10^3/uL 10^3/uL





    (0.00-0.10) 


 


Sodium  136 mEq/L mEq/L  





   (134-144)  


 


Potassium  3.1 mEq/L L mEq/L  





   (3.5-5.2)  


 


Chloride  101 mEq/L mEq/L  





   ()  


 


Carbon Dioxide  29 mEq/l mEq/l  





   (22-31)  


 


Anion Gap  6 mEq/L L mEq/L  





   (8-16)  


 


BUN  5 mg/dL L mg/dL  





   (7-23)  


 


Creatinine  0.8 mg/dL mg/dL  





   (0.7-1.3)  


 


Estimated GFR  > 60   





   


 


Glucose  79 mg/dL mg/dL  





   ()  


 


Calcium  8.1 mg/dL L mg/dL  





   (8.5-10.4)  


 


Ethyl Alcohol  101 mg/dL H mg/dL  





   (0-10)  











Medications Given: 


 








Discontinued Medications





Sodium Chloride (Ns)  1,000 mls @ 0 mls/hr IV ONCE ONE


   PRN Reason: Wide Open


   Stop: 04/17/17 01:24


   Last Admin: 04/17/17 01:38 Dose:  1,000 mls


Potassium Chloride (Klor Packets)  40 meq PO EDNOW ONE


   Stop: 04/17/17 02:22


   Last Admin: 04/17/17 02:40 Dose:  40 meq








Departure





- Departure


Disposition: Home, Routine, Self-Care


Clinical Impression: 


Alcohol intoxication


Qualifiers:


 Complication of substance-induced condition: uncomplicated Qualified Code(s): 

F10.120 - Alcohol abuse with intoxication, uncomplicated





Condition: Good


Instructions:  Alcohol Intoxication (ED)


Referrals: 


Patient,NotPresent [Unknown] - As per Instructions

## 2018-01-03 ENCOUNTER — HOSPITAL ENCOUNTER (INPATIENT)
Dept: HOSPITAL 80 - FED | Age: 73
LOS: 2 days | Discharge: NURSING, REHAB OR LONG-TERM CARE | DRG: 184 | End: 2018-01-05
Attending: FAMILY MEDICINE | Admitting: HOSPITALIST
Payer: COMMERCIAL

## 2018-01-03 DIAGNOSIS — F17.210: ICD-10-CM
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
## 2018-01-03 DIAGNOSIS — J96.11: ICD-10-CM

## 2018-01-03 DIAGNOSIS — J44.1: ICD-10-CM

## 2018-01-03 DIAGNOSIS — Z85.46: ICD-10-CM

## 2018-01-03 DIAGNOSIS — F10.20: ICD-10-CM

## 2018-01-03 DIAGNOSIS — S22.41XA: Primary | ICD-10-CM

## 2018-01-03 DIAGNOSIS — G89.29: ICD-10-CM

## 2018-01-03 DIAGNOSIS — Z99.81: ICD-10-CM

## 2018-01-03 DIAGNOSIS — Z87.01: ICD-10-CM

## 2018-01-03 DIAGNOSIS — Z66: ICD-10-CM

## 2018-01-03 DIAGNOSIS — F11.20: ICD-10-CM

## 2018-01-03 DIAGNOSIS — M48.02: ICD-10-CM

## 2018-01-03 DIAGNOSIS — C79.51: ICD-10-CM
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
## 2018-01-03 LAB — PLATELET # BLD: 248 10^3/UL (ref 150–400)

## 2018-01-03 PROCEDURE — G0378 HOSPITAL OBSERVATION PER HR: HCPCS

## 2018-01-03 RX ADMIN — GABAPENTIN SCH MG: 300 CAPSULE ORAL at 16:27

## 2018-01-03 RX ADMIN — MORPHINE SULFATE SCH MG: 15 TABLET, FILM COATED, EXTENDED RELEASE ORAL at 20:27

## 2018-01-03 RX ADMIN — NICOTINE SCH MG: 14 PATCH TRANSDERMAL at 20:27

## 2018-01-03 RX ADMIN — KETOROLAC TROMETHAMINE SCH MG: 15 INJECTION, SOLUTION INTRAMUSCULAR; INTRAVENOUS at 23:40

## 2018-01-03 RX ADMIN — TEMAZEPAM PRN MG: 15 CAPSULE ORAL at 23:40

## 2018-01-03 RX ADMIN — KETOROLAC TROMETHAMINE SCH MG: 15 INJECTION, SOLUTION INTRAMUSCULAR; INTRAVENOUS at 17:18

## 2018-01-03 RX ADMIN — OXYCODONE HYDROCHLORIDE PRN MG: 15 TABLET ORAL at 22:09

## 2018-01-03 RX ADMIN — GABAPENTIN SCH MG: 300 CAPSULE ORAL at 20:27

## 2018-01-03 RX ADMIN — OXYCODONE HYDROCHLORIDE PRN MG: 15 TABLET ORAL at 17:17

## 2018-01-03 RX ADMIN — ONDANSETRON PRN MG: 4 TABLET, ORALLY DISINTEGRATING ORAL at 22:09

## 2018-01-03 RX ADMIN — Medication SCH ML: at 17:46

## 2018-01-03 NOTE — GHP
[f 
rep st]



                                                            HISTORY AND PHYSICAL





DATE OF ADMISSION:  01/03/2018



CHIEF COMPLAINT:  Rib fracture, difficulty breathing.



HISTORY OF PRESENT ILLNESS:  A 72-year-old male with history of COPD, chronic 
hypoxemic respiratory failure on 4 L, and chronic opioid dependence, who 
presented with right posterior rib pain after a fall.  He said he fell asleep 
in his recliner last night and tried to put down the foot rest and slipped, 
landing on his right side.  He felt immediate pain that has been constant, 
lasting approximately 5-6 hours, and came to the emergency room.  He denies any 
dizziness or chest pain.  He is breathing more shallow due to the pain.  He did 
not have loss of consciousness or hit his head.  He does endorse having 1-2 
alcoholic drinks the night before.  He felt faintish last night as he did not 
eat much yesterday because he did not have much of an appetite.  Denies fevers, 
chills, or sweats.  No nausea, vomiting, or diarrhea.  No cough.



REVIEW OF SYSTEMS:  I completed a 10-point review of systems, negative except 
as noted in HPI.



PAST MEDICAL HISTORY:  

1.  Chronic hypoxemic respiratory failure, on 4 L oxygen.

2.  Alcohol dependence.

3.  Chronic pain with opioid dependency.

4.  History of aspiration pneumonia.

5.  History of prostate cancer, status post Lupron.

6.  COPD.

7.  History of prior suicide ideation and attempt.

8.  Cervical spine stenosis.



PAST SURGICAL HISTORY:  Appendectomy.



FAMILY HISTORY:  Cousin with prostate cancer.  An uncle, aunt, and grandpa with 
an unknown type of cancer.



SOCIAL HISTORY:  Lives at [facility].  Smokes tobacco, a 52 pack/
year history, now smoking 12-15 a day.  Alcohol:  At least 2-3 alcoholic 
beverages a day.



ALLERGIES:  Sulfas.



HOME MEDICATIONS:  Simethicone, oxycodone 15 mg q.4 hours p.r.n., MS Contin 15 
mg b.i.d., potassium 20meq p.o. daily, Restoril 30 mg q.h.s. p.r.n., omeprazole 
40 mg daily, Lidoderm patch, gabapentin 900 mg t.i.d., Lasix 20 mg daily, 
Flexeril 10 mg p.r.n., albuterol 2 puffs q.4 p.r.n.



PHYSICAL EXAMINATION:  VITAL SIGNS:  Temperature 36.8, blood pressure 166/93, 
heart rate in the 70s, respiration 20, 95% on 5 L.  GENERAL:  Sitting up in bed
, in no acute distress, smiling.  HEENT:  PERRLA.  EOMI.  Oropharynx clear.  
Mildly dry mucous membranes.  CV:  Regular rate and rhythm.  No murmurs, gallops
, or rubs.  LUNGS:  Diminished but no wheezing or crackles.  Tenderness over 
posterior right ribs.  ABDOMEN:  Soft, nontender.  MUSCULOSKELETAL:  Moving all 
4 extremities.  NEURO:  2-12 intact.  PSYCH:  Alert and oriented x3.



LABS:  WBC is 10, hemoglobin 14, hematocrit 41, platelets 248.  Sodium 142, 
potassium 4.3, chloride 101, creatinine 0.7, glucose 106, calcium 9.9. 



Chest x-ray:  Acute fractures posterolateral right 7th, 8th, and 9th ribs.  
Fracture distal head of left clavicle, new from April 2017.  Old healed 
fractures bilaterally involving the ribs.



ASSESSMENT AND PLAN:  

1.  Acute rib fractures:  Secondary to fall.  This may have been in the setting 
of alcohol intoxication.  He is currently stable on his home oxygen needs. 
Treat acutely with IV Toradol; no increase in narcotics.  He can trial a 
heating pad and incentive spirometry.

2.  Chronic pain with chronic opioid dependency.  We will resume his home 
medication dose.

3.  History of prostate cancer, status post Lupron.

4.  Chronic obstructive pulmonary disease.  No evidence of exacerbation.

5.  Chronic hypoxemic respiratory failure, stable on 4-5 L.  Continue albuterol.

6.  Cervical spinal stenosis.  Resume home medications.  Lidoderm patch.

7. Tobacco use: nicotine patch

8.  DVT prophylaxis:  SCDs.

9.  Alcohol dependence.  We will provide vodka b.i.d. to prevent alcohol 
withdrawal.  No evidence of withdrawal at this point.109.  10. Diet regular.



DISPOSITION:  Patient warrants observation admission given acute rib fractures 
and pain requiring IV Toradol, incentive spirometry, and pulse oximetry.





Job #:  [id number]

MTDD

## 2018-01-03 NOTE — EDPHY
H & P


Stated Complaint: fall R posterior rib pain


Time Seen by Provider: 01/03/18 11:55


HPI/ROS: 


HPI:  This is a 72-year-old male presents with





Chief Complaint: fall Right posterior rib pain





Location:  Right lower posterior rib


Quality:  Pain


Duration:  Since 5:00 a.m., approximately 7 hr


Signs and Symptoms:  + chronic shortness of breath at rest, + chronic shortness 

of breath on exertion, + chronic cough, no chest pain, no palpitations, no 

lower extremity edema, no wheezing, no orthopnea, no paroxysmal nocturnal 

dyspnea, no fever, + injury/trauma, no hemoptysis


Timing:  Acute


Severity:  Moderate to severe


Context:  Patient has a history of COPD, on supplemental oxygen 2-3 L at night 

and several hours during the day, tobacco user, chronic alcoholic, chronic pain

, history of bone cancer with mets from prostate presents via cab with 

complaints of right posterior lower rib pain worsened with palpation. Denies 

worsening with taking a deep breath. Patient reports that he is always 

chronically short of breath and does not feel any different at this time.  

Patient sleeps in his recliner.  This morning he got out of his recliner around 

5:00 a.m. and tripped over his feet, falling directly onto the table and 

hitting his right lower posterior ribs.  He felt immediate pain, that has 

remained constant for the last 7 hr so patient came to the emergency room to 

evaluate for rib fracture.  Patient denies dizziness/chest pain/shortness of 

breath/head injury/LOC/neck pain. Does not take any blood thinners.  Gabapentin

, Flexeril and naproxen have not relieved his pain symptoms. Lives at assisted 

living.  Does not take any blood thinners. 


Modifying Factors:  None





Comment: 








ROS: see HPI


Constitutional: No fever, no chills, no weight loss


Eyes:  No blurred vision


Respiratory:  No shortness of breath, no cough


Cardiovascular:  No chest pain, no palpitations, no lower extremity edema 


Gastrointestinal:  No nausea, no vomiting, no diarrhea 


Genitourinary:  No dysuria 


Extremities:  No myalgias 


Neurologic:  No weakness, no numbness


Skin:  No rashes


Hematologic:  No bruising, no bleeding





MEDICAL/SURGICAL/SOCIAL HISTORY: 


Medical history:  HX of Bone CA mets from prostate, HTN, life time smoker 3/4 

ppd now, chronic etoh, narcs and benzos, chronic pain, 


Surgical history:  Denies


Social history:  Retired.  From assisted living. 








CONSTITUTIONAL:  Elderly chronically ill-appearing male, pleasant talkative, 

nontoxic in appearance, smells heavily of tobacco, awake and alert, no obvious 

distress


HEENT: Atraumatic and normocephalic, PERRL, EOMI. no globe entrapment, no 

raccoon eyes.  no Daniel signs. Tympanic membranes clear. No tympanic membrane 

rupture.  Nares patent; no septal hematoma. Oropharynx clear, no exudate and 

moist pink mucosa. No malocclusion. no dental trauma.  Airway patent.  No 

lymphadenopathy. 


NECK: supple, no midline tenderness, flexion 45 degrees, extension 45 degrees, 

right and left lateral flexion 45 degrees. No meningismus.


Cardiovascular: Normal S1/S2, regular rate, regular rhythm, without murmur rub 

or gallop.


PULMONARY/CHEST:  Thoracic kyphoscoliosis noted. Moderate tenderness in the 

right lower posterior ribs; no ecchymosis, no crepitus.  Coarseness 

bilaterally. Fair air movement. No accessory muscle usage.  No tachypnea. 


ABDOMEN:  Soft, nondistended, nontender, no ecchymosis, no rebound, no guarding

, no peritoneal signs, no masses or organomegaly. No CVAT.


PELVIC: no pain with rocking; bilateral hips flexion 125 degrees, extension 30 

degrees, with no pain internal rotation and no pain external rotation.


BACK:  No midline tenderness, no paraspinous spasm, deep tendon reflexes 2/2, 

no pain with straight leg raise


EXTREMITIES:  2/2 pulses, Right SHOULDER:  Arc test abduction to 100, 

abduction to 45, horizontal flexion 100, horizontal extension to 45, deltoid 

strength 4/5.  no Tenderness to palpation over AC joint.  No clavicle deformity/

tenderness. no clubbing, no cyanosis or edema.


NEUROLOGICAL: no focal neuro deficits.  GCS 15. 


SKIN: Warm and dry, no erythema. no rash.  Good capillary refill. 








Source: Patient


Exam Limitations: No limitations





- Personal History


Current Tetanus/Diphtheria Vaccine: Unsure


Current Tetanus Diphtheria and Acellular Pertussis (TDAP): Unsure





- Medical/Surgical History


Hx Asthma: No


Hx Chronic Respiratory Disease: Yes


Hx Diabetes: No


Hx Cardiac Disease: Yes


Hx Renal Disease: No


Hx Cirrhosis: No


Hx Alcoholism: Yes


Hx HIV/AIDS: No


Hx Splenectomy or Spleen Trauma: No


Other PMH: HX of Bone CA mets from prostate, HTN, is a life time smoker 3/4 ppd 

now , chronic etoh, narcs and benzos, chronic pain,





- Social History


Smoking Status: Heavy smoker


Constitutional: 


 Initial Vital Signs











Temperature (C)  36.9 C   01/03/18 11:43


 


Heart Rate  80   01/03/18 11:43


 


Respiratory Rate  20   01/03/18 11:43


 


Blood Pressure  147/89 H  01/03/18 11:43


 


O2 Sat (%)  89 L  01/03/18 11:43








 











O2 Delivery Mode               Room Air














Allergies/Adverse Reactions: 


 





Sulfa (Sulfonamide Antibiotics) [Sulfa(Sulfonamide Antibiotics)] Allergy (

Verified 01/03/18 11:37)


 








Home Medications: 














 Medication  Instructions  Recorded


 


Albuterol [Proventil Inhaler HFA 2 puffs IH Q4H PRN 03/01/17





(*)]  


 


Cyclobenzaprine [Flexeril 10 MG 10 mg PO TID PRN 03/01/17





(*)]  


 


Gabapentin [Neurontin 300 MG (*)] 600 mg PO TID 03/01/17


 


Naproxen Sodium [Aleve 220 MG (*)] 220 mg PO QID PRN 03/01/17


 


Omeprazole 40 mg PO DAILY 03/01/17


 


Temazepam [Restoril 15 MG (*)] 30 mg PO HS PRN 03/01/17


 


Acetaminophen [Tylenol 325mg (*)] 650 mg PO Q4HRS PRN #0 tab 03/02/17


 


Benadryl  01/03/18


 


Lasix  01/03/18


 


Lidocaine 5%  01/03/18


 


Ms Contin  01/03/18


 


Oxycodone HCl  01/03/18


 


Potassium Chloride  01/03/18


 


Sennosides  01/03/18


 


Simethicone  01/03/18














Medical Decision Making





- Diagnostics


Imaging Results: 


 Imaging Impressions





Ribs w/Chest X-Ray  01/03/18 11:57


Impression:


1. Acute fractures suspected associated with the posterior lateral right seventh

, eighth, ninth ribs.


2. Fracture distal head left clavicle that has developed since the prior chest x

-ray study from April, 2017. Clinical correlation recommended with respect to 

trauma in this location.


3. Old healed fractures bilaterally involving the ribs.


4. Poor inspiration with bibasilar subsegmental atelectasis suspected. 

Underlying interstitial fibrosis is also possible.











ED Course/Re-evaluation: 


Chest x-ray and rib series, labs and IV medications ordered ordered


Given IV morphine and topical Lidoderm patch. 


O2 sats 86-89% on room air upon arrival. 


Patient is afebrile; no systemic signs. Doubt infectious etiology. 


Based on the Rich head CT and cervical CT scan ruling:  Imaging not 

indicated. 


Fall is accidental in nature.  


Chest x-ray and rib x-ray show multiple rib fractures on the right; posterior 

lateral right seventh, eighth, ninth ribs.  No signs of pneumothorax.


2. Fracture distal head left clavicle; unsure if acute or subacute; patient is 

asymptomatic; placed in right sling. 


Patient has lung disease/oxygen dependent/multiple risk factors/hypoxia, will 

need admission for close monitoring and oxygen therapy. 


1310: ED decision to consult for admission.  Spoke with hospitalist as well as 

trauma. Dr. Ferrer will admit patient and Dr. Harrington consult.











This patient was seen under the supervision of my secondary supervising 

physician.  I evaluated care for this patient independently.  Discussed this 

patient with Dr. Tomas who did not see the patient.   Patient's presentation, 

labs/imaging, treatment and plan of care were discussed with secondary 

supervising physician.  





Differential Diagnosis: 


Differential diagnosis includes but is not limited to rib fracture, rib 

contusion, pneumothorax, COPD, pneumonia. 








- Data Points


Medications Given: 


 





Miscellaneous Information (Patch Removal)  1 ea TD [id number] GREGG


   Stop: 07/02/18 20:59


   Last Admin: 01/03/18 12:49 Dose:  Not Given





Discontinued Medications





Lidocaine (Lidoderm 5%)  1 ea TD EDNOW ONE


   Stop: 01/03/18 12:03


   Last Admin: 01/03/18 12:17 Dose:  1 ea








Departure





- Departure


Disposition: [facility] Inpatient Acute


Clinical Impression: 


 Hypoxia





Multiple fractures of ribs of right side


Qualifiers:


 Encounter type: initial encounter Fracture type: closed Qualified Code(s): 

S22.41XA - Multiple fractures of ribs, right side, initial encounter for closed 

fracture





Accidental fall from chair


Qualifiers:


 Encounter type: initial encounter Qualified Code(s): W07.XXXA - Fall from chair

, initial encounter





Closed right clavicular fracture


Qualifiers:


 Encounter type: initial encounter Clavicle location: sternal end Fracture 

alignment: nondisplaced Qualified Code(s): S42.017A - Nondisplaced fracture of 

sternal end of right clavicle, initial encounter for closed fracture





Condition: Fair

## 2018-01-03 NOTE — SOAPPROG
SOAP Progress Note


Assessment/Plan: 


Assessment:


32-YEAR-OLD MALE WHO FELL SUSTAINING MULTIPLE RIGHT RIB FRACTURES


CHEST X-RAY REVEALS NO PNEUMOTHORAX OR HEMOTHORAX


PATIENT ALSO COMPLAINS OF SOME RIGHT SHOULDER PAIN


HE HAS A HISTORY OF COPD AND ALCOHOL ABUSE





HEENT NONICTERIC, PERRLA, A SUPPLE NECK


CHEST CLEAR AND SYMMETRIC, AS TENDERNESS OVER HIS RIGHT LATERAL RIBS


COR REGULAR RHYTHM


ABDOMEN SOFT NONTENDER


EXTREMITIES FULL RANGE OF MOTION FULL PULSES





IMPRESSION:  MULTIPLE RIGHT RIB FRACTURES























Plan:  PULMONARY TOILET/FOLLOW-UP CHEST X-RAY





01/03/18 21:50





Objective: 





 Vital Signs











Temp Pulse Resp BP Pulse Ox


 


 37.1 C   73   18   134/70 H  84 L


 


 01/03/18 20:00  01/03/18 20:00  01/03/18 20:00  01/03/18 20:00  01/03/18 20:00








 Laboratory Results





 01/03/18 13:35 





 01/03/18 13:35 











ICD10 Worksheet


Patient Problems: 


 Problems











Problem Status Onset


 


Accidental fall from chair Acute  


 


Closed right clavicular fracture Acute  


 


Hypoxia Acute  


 


Multiple fractures of ribs of right side Acute  


 


Acute encephalopathy Acute  


 


Acute hyperactive alcohol withdrawal delirium Acute  


 


Altered mental status Acute  


 


Aspiration into airway Acute  


 


Chronic pain Acute  


 


Dehydration Acute  


 


Failure to thrive Acute  


 


Generalized weakness Acute  


 


History of fall Acute  


 


Opiate dependence, continuous Acute  


 


Palliative care encounter Acute  


 


Pancreatitis Acute  


 


Pneumonia Acute  


 


Pneumonia Acute  


 


Polypharmacy Acute  


 


Polysubstance abuse Acute  


 


Polysubstance dependence including opioid drug with daily use Acute  


 


Recurrent falls Acute  


 


Rib fractures Acute  


 


Thoracic compression fracture Acute

## 2018-01-04 RX ADMIN — KETOROLAC TROMETHAMINE SCH MG: 15 INJECTION, SOLUTION INTRAMUSCULAR; INTRAVENOUS at 23:28

## 2018-01-04 RX ADMIN — POTASSIUM CHLORIDE SCH MEQ: 1500 TABLET, EXTENDED RELEASE ORAL at 09:00

## 2018-01-04 RX ADMIN — GABAPENTIN SCH MG: 300 CAPSULE ORAL at 09:00

## 2018-01-04 RX ADMIN — MORPHINE SULFATE SCH MG: 15 TABLET, FILM COATED, EXTENDED RELEASE ORAL at 23:30

## 2018-01-04 RX ADMIN — MORPHINE SULFATE SCH MG: 15 TABLET, FILM COATED, EXTENDED RELEASE ORAL at 09:02

## 2018-01-04 RX ADMIN — Medication SCH: at 11:44

## 2018-01-04 RX ADMIN — ONDANSETRON PRN MG: 4 TABLET, ORALLY DISINTEGRATING ORAL at 11:45

## 2018-01-04 RX ADMIN — PANTOPRAZOLE SODIUM SCH MG: 40 TABLET, DELAYED RELEASE ORAL at 09:02

## 2018-01-04 RX ADMIN — TEMAZEPAM PRN MG: 15 CAPSULE ORAL at 23:29

## 2018-01-04 RX ADMIN — OXYCODONE HYDROCHLORIDE PRN MG: 15 TABLET ORAL at 09:01

## 2018-01-04 RX ADMIN — OXYCODONE HYDROCHLORIDE PRN MG: 15 TABLET ORAL at 23:29

## 2018-01-04 RX ADMIN — Medication SCH ML: at 18:17

## 2018-01-04 RX ADMIN — GABAPENTIN SCH MG: 300 CAPSULE ORAL at 17:04

## 2018-01-04 RX ADMIN — ENOXAPARIN SODIUM SCH MG: 100 INJECTION SUBCUTANEOUS at 09:02

## 2018-01-04 RX ADMIN — FUROSEMIDE SCH MG: 20 TABLET ORAL at 09:02

## 2018-01-04 RX ADMIN — KETOROLAC TROMETHAMINE SCH MG: 15 INJECTION, SOLUTION INTRAMUSCULAR; INTRAVENOUS at 11:45

## 2018-01-04 RX ADMIN — NICOTINE SCH MG: 14 PATCH TRANSDERMAL at 08:59

## 2018-01-04 RX ADMIN — LIDOCAINE SCH EA: 50 PATCH TOPICAL at 09:02

## 2018-01-04 RX ADMIN — KETOROLAC TROMETHAMINE SCH MG: 15 INJECTION, SOLUTION INTRAMUSCULAR; INTRAVENOUS at 18:16

## 2018-01-04 RX ADMIN — OXYCODONE HYDROCHLORIDE PRN MG: 15 TABLET ORAL at 18:40

## 2018-01-04 RX ADMIN — OXYCODONE HYDROCHLORIDE PRN MG: 15 TABLET ORAL at 05:24

## 2018-01-04 RX ADMIN — GABAPENTIN SCH MG: 300 CAPSULE ORAL at 21:10

## 2018-01-04 RX ADMIN — KETOROLAC TROMETHAMINE SCH MG: 15 INJECTION, SOLUTION INTRAMUSCULAR; INTRAVENOUS at 05:24

## 2018-01-04 NOTE — PDMN
Medical Necessity


Medical necessity: change to IP; los>2mn for multiple ribs fx's r/t fall, COPD w

/mild exacerbation; requires initiate of Prednisone, pain control; comorbid 

chronic resp failure on O2 4L @ baseline, and etoh dependence; per order and 

progress note 1/4/18

## 2018-01-04 NOTE — TRAUMAPN
Assessment/Plan: 


73[facility]


TERTIARY EXAM


Neuro:  Alert, oriented, nonfocal.  Pain does appear well controlled with 

current medical management.


Pulm:  On supplemental oxygen, where supplemental oxygen at home.  Lungs are 

clear with minimal expiratory wheezes on the right. No crepitus.  Patient 

currently pulling over 2000 on his spirometer.  Discussed the need for 

continued pulmonary toilet


CV:  Hemodynamically stable


Abdomen:  Soft nondistended nontender.  Tolerating a regular diet.


Renal:  Voiding


Heme:  Stable


Id:  Afebrile


Ortho:  Rib fractures as above, no additional fractures identified today


Dispo:  Pain control, medical management.  Anticipate home in the next 24 hours


Subjective: 





Alert, c/o back pain. 


Objective: 





 Vital Signs











Temp Pulse Resp BP Pulse Ox


 


 36.4 C   63   18   103/60   9 L


 


 01/04/18 08:00  01/04/18 08:00  01/04/18 08:00  01/04/18 08:00  01/04/18 08:00








 Laboratory Results





 01/03/18 13:35 





 01/04/18 04:26

## 2018-01-04 NOTE — HOSPPROG
Hospitalist Progress Note


Assessment/Plan: 


New pt encounter





73 yo male with hx of COPD, chronic resp failure on 4L baseline, admitted 

following fall with subsequent multiple right sided rib fractures








#COPD with mild exacerbation





#chronic resp failure at baseline





#s/p Fall





#Acute Right sided rib fracture, no signs of pneumothorax or Hemothorax


-Trauma is following





#Hx of alcohol dependance





#Tobacco abuse disorder





#DVT proph: Lovenox





Plan:


-Start Prednisone


-Pain mgmt


-Scheduled Vodka


-Nicotine patch


-home meds


-f/u CXR





Dispo: If clinically stable, anticipate d/c soon, possibly tomorrow


Subjective: + cough, + wheeze, no pedal edema. Afebrile.


Objective: 


 Vital Signs











Temp Pulse Resp BP Pulse Ox


 


 36.4 C   63   18   103/60   9 L


 


 01/04/18 08:00  01/04/18 08:00  01/04/18 08:00  01/04/18 08:00  01/04/18 08:00








 Laboratory Results





 01/03/18 13:35 





 01/04/18 04:26 











- Physical Exam


Constitutional: no apparent distress


Eyes: PERRL, EOMI


Ears, Nose, Mouth, Throat: moist mucous membranes, hearing normal


Cardiovascular: regular rate and rhythym, No edema


Respiratory: reduced air movement, expiratory wheeze


Gastrointestinal: normoactive bowel sounds, soft, non-tender abdomen


Genitourinary: no bladder fullness


Skin: warm


Musculoskeletal: full muscle strength


Neurologic: AAOx3


Psychiatric: interacting appropriately, not anxious, not encephalopathic


Lymph, Heme, Immunologic: No petechiae





ICD10 Worksheet


Patient Problems: 


 Problems











Problem Status Onset


 


Accidental fall from chair Acute  


 


Closed right clavicular fracture Acute  


 


Hypoxia Acute  


 


Multiple fractures of ribs of right side Acute  


 


Acute encephalopathy Acute  


 


Acute hyperactive alcohol withdrawal delirium Acute  


 


Altered mental status Acute  


 


Aspiration into airway Acute  


 


Chronic pain Acute  


 


Dehydration Acute  


 


Failure to thrive Acute  


 


Generalized weakness Acute  


 


History of fall Acute  


 


Opiate dependence, continuous Acute  


 


Palliative care encounter Acute  


 


Pancreatitis Acute  


 


Pneumonia Acute  


 


Pneumonia Acute  


 


Polypharmacy Acute  


 


Polysubstance abuse Acute  


 


Polysubstance dependence including opioid drug with daily use Acute  


 


Recurrent falls Acute  


 


Rib fractures Acute  


 


Thoracic compression fracture Acute

## 2018-01-04 NOTE — ASMTCMCOM
CM Note

 

CM Note                       

Notes:

Patient admitted for rib fractures that resulted from a fall. He is also having an acute on chronic 


COPD exacerbation. He is O2 dependent at baseline.



Patient lives at [facility]. Per PT notes, he took a cab to the hospital. PT has cleared him to 

go back to [facility]. I called [facility] and left a message informing them of his likely discharge 

tomorrow. CM available for any dishcharge needs. 

 

Date Signed:  01/04/2018 03:21 PM

Electronically Signed By:Lucrecia Garrison RN

## 2018-01-05 VITALS — OXYGEN SATURATION: 86 %

## 2018-01-05 VITALS
HEART RATE: 66 BPM | RESPIRATION RATE: 12 BRPM | TEMPERATURE: 97.8 F | SYSTOLIC BLOOD PRESSURE: 125 MMHG | DIASTOLIC BLOOD PRESSURE: 71 MMHG

## 2018-01-05 RX ADMIN — KETOROLAC TROMETHAMINE SCH MG: 15 INJECTION, SOLUTION INTRAMUSCULAR; INTRAVENOUS at 06:06

## 2018-01-05 RX ADMIN — GABAPENTIN SCH MG: 300 CAPSULE ORAL at 08:28

## 2018-01-05 RX ADMIN — NICOTINE SCH MG: 14 PATCH TRANSDERMAL at 08:30

## 2018-01-05 RX ADMIN — OXYCODONE HYDROCHLORIDE PRN MG: 15 TABLET ORAL at 10:44

## 2018-01-05 RX ADMIN — MORPHINE SULFATE SCH MG: 15 TABLET, FILM COATED, EXTENDED RELEASE ORAL at 08:29

## 2018-01-05 RX ADMIN — PANTOPRAZOLE SODIUM SCH MG: 40 TABLET, DELAYED RELEASE ORAL at 08:29

## 2018-01-05 RX ADMIN — ENOXAPARIN SODIUM SCH MG: 100 INJECTION SUBCUTANEOUS at 08:30

## 2018-01-05 RX ADMIN — KETOROLAC TROMETHAMINE SCH MG: 15 INJECTION, SOLUTION INTRAMUSCULAR; INTRAVENOUS at 12:11

## 2018-01-05 RX ADMIN — POTASSIUM CHLORIDE SCH MEQ: 1500 TABLET, EXTENDED RELEASE ORAL at 08:29

## 2018-01-05 RX ADMIN — LIDOCAINE SCH EA: 50 PATCH TOPICAL at 08:30

## 2018-01-05 RX ADMIN — FUROSEMIDE SCH MG: 20 TABLET ORAL at 08:29

## 2018-01-05 RX ADMIN — Medication SCH ML: at 12:13

## 2018-01-05 NOTE — PDHOMEO2F
Home Oxygen Face to Face


Home Orders: 


I certify that a physician or a nurse practitioner or physician's assistant has 

had a face-to-face encounter with this patient on the date of this order due to 

the diagnosis listed, which relates to the primary reason the patient requires 

home oxygen. Alternative treatments have been tried, or considered, and deemed 

ineffective. It is anticipated that supplemental oxygen will result in 

improvement with treatment.





Home oxygen qualifying diagnosis: copd


SpO2 on room air (%): 88%


Frequency of home oxygen needed: continuous


Home oxygen liters per minute: 4


Home oxygen delivery device: nasal cannula


Concentrator: Yes


E-tanks for mobility and back up: Yes


If ordering portable O2, is the patient mobile in the home?: Yes


I certify that, based on these findings, the home oxygen is medically necessary 

for this patient for the following length of time.





Length of time home oxygen needed: 99 years

## 2018-01-05 NOTE — PDHOMEO2F
Home Oxygen Face to Face


Home Orders: 


I certify that a physician or a nurse practitioner or physician's assistant has 

had a face-to-face encounter with this patient on the date of this order due to 

the diagnosis listed, which relates to the primary reason the patient requires 

home oxygen. Alternative treatments have been tried, or considered, and deemed 

ineffective. It is anticipated that supplemental oxygen will result in 

improvement with treatment.





Home oxygen qualifying diagnosis: copd


SpO2 on room air (%): 86


Frequency of home oxygen needed: with activity, continuous, during sleep


Home oxygen liters per minute: 4


Home oxygen delivery device: nasal cannula


Concentrator: Yes


E-tanks for mobility and back up: Yes


If ordering portable O2, is the patient mobile in the home?: Yes


I certify that, based on these findings, the home oxygen is medically necessary 

for this patient for the following length of time.





Length of time home oxygen needed: 99 years

## 2018-01-05 NOTE — TRAUMAPN
Assessment/Plan: 


71yo M c MMP s/p mech fall c R 7-9 rib fx


- looking much better today, pain appears better controlled


- Lung clear. Is>2000cc


- ok with dc today per IM 


Subjective: 





Doing well, pain appears better controlled today 


Objective: 





 Vital Signs











Temp Pulse Resp BP Pulse Ox


 


 36.6 C   66   12   125/71 H  90 L


 


 01/05/18 07:22  01/05/18 07:22  01/05/18 07:22  01/05/18 07:22  01/05/18 07:22

## 2018-01-05 NOTE — PDDCSUM
Discharge Summary


Discharge Summary: 








71 yo male with hx of COPD, chronic resp failure on 4L baseline, admitted 

following fall with subsequent multiple right sided rib fractures. Had mild COPD

-E, treated with steroid burst. 


Pain is well controlled. Breathing is at baseline. Cough has improved. Ready 

for D/C





Consultants: Trauma








DDX:








#COPD with mild exacerbation





#chronic resp failure at baseline





#s/p Fall





#Acute Right sided rib fracture, no signs of pneumothorax or Hemothorax


-Trauma is following





#Hx of alcohol dependance





#Tobacco abuse disorder





#DVT proph: Lovenox





Exam:


ON 4l 


OTHERWISE VSS


NAD


AAOX3


RRR


MILD EXP WHEEZE


S/NT/ND


NO LE EDEMA





MEDS: SEE MED REC





F/U: WITH PCP NEXT WEEK





TOTAL TIME SPENT ON D/C IS 35 MINS

## 2018-01-05 NOTE — PDIAF
- Diagnosis


Diagnosis: rib fractures


Code Status: Do Not Resuscitate





- Medication Management


Discharge Medications: 


 Medications to Continue on Transfer





Albuterol [Proventil Inhaler HFA (*)] 2 puffs IH Q4H PRN 03/01/17 [Last Taken 03 /08/17]


Cyclobenzaprine [Flexeril 10 MG (*)] 10 mg PO TID PRN 03/01/17 [Last Taken 03/08 /17]


Gabapentin [Neurontin 300 MG (*)] 900 mg PO TID 03/01/17 [Last Taken 01/03/18]


Omeprazole 40 mg PO DAILY 03/01/17 [Last Taken 01/03/18]


Temazepam [Restoril 15 MG (*)] 30 mg PO HS PRN 03/01/17 [Last Taken 01/02/18]


Furosemide [Lasix 20 MG (*)] 20 mg PO DAILY 01/03/18 [Last Taken 01/03/18]


Potassium Cl [Klor-Con 20 meq (*)] 20 meq PO DAILY 01/03/18 [Last Taken 01/03/18

]


Simethicone 125 mg PO Q6HRS PRN 01/03/18 [Last Taken 01/03/18]


morphINE SR [Ms Contin/Oramorph 15 mg (*)] 15 mg PO BID 01/03/18 [Last Taken 01/ 03/18]


Lidocaine 5% [Lidoderm 5% Patch (*)] 1 ea TD DAILY #10 patch 01/05/18 [Last 

Taken Unknown]


oxyCODONE IR [Oxycodone Ir (*)] 15 mg PO Q4HRS PRN #30 tab 01/05/18 [Last Taken 

Unknown]


predniSONE 40 mg PO DAILY #8 tablet 01/05/18 [Last Taken Unknown]








Discharge Medications: Refer to the Discharge Home Medication list for PRN 

reason.





- Orders


Services needed: Home Care, Physical Therapy, Occupational Therapy


Home Care Face to Face: I certify that this patient was under my care and that 

I had the required face-to-face encounter meeting the encounter requirements on 

the discharge day.  My findings support the fact that the patient is homebound 

as defined in


Home Care Face to Face Continued: CMS Chapter 7 Medicare Benefits Manual 30.1.1

, The condition of the patient is such that there exists a normal inability to 

leave home and consequently, leaving home would require a considerable and 

taxing effort.


Isolation Type: None


Diet Recommendation: no restrictions on diet


Diet Texture: Regular Texture Diet





- Follow Up Care


Current Providers and Referrals: 


NONE *PRIMARY CARE P,. [Primary Care Provider] -

## 2018-01-05 NOTE — ASDISCHSUM
----------------------------------------------

Discharge Information

----------------------------------------------

Plan Status:Assisted Living                          Medically Cleared to Leave:

Discharge Date:01/05/2018 02:59 PM                   CM D/C Disposition:Assisted Living

ADT D/C Disposition:Skilled Nursing Facility         Projected Discharge Date:01/05/2018 11:00 AM

Transportation at D/C:Taxicab                        Discharge Delay Reason:

Follow-Up Date:01/05/2018 11:00 AM                   Discharge Slot:

Final Diagnosis:

----------------------------------------------

Placement Information

----------------------------------------------

Referral Type:*Home Health Care Services             Referral ID:[id number]

Provider Name:

Address 1:                                           Phone Number:

Address 2:                                           Fax Number:

City:                                                Selection Factors:

State:

 

Referral Type:Assisted Living Residence              Referral ID:[id number]

Provider Name:Harjinder AyalaHouston

Address 1:[address] [place] [address]                               Phone [phone]

Address 2:                                           Fax [phone]

City:[place]                                         Selection Factors:

State:[place]

 

----------------------------------------------

Patient Contact Information

----------------------------------------------

Contact Name:EDWARD                             Relationship:Sister

Address:[address] [phone]

                                                     Work Phone:

City:[place]                                         Alternate Phone:

State/Zip Code:[place] [address]                              Email:

----------------------------------------------

Financial Information

----------------------------------------------

Financial Class:

Primary Plan Desc:MEDICARE INPATIENT                 Primary Plan Number:[id number]

Secondary Plan Desc:AARP/MDR SUPPLEMENT              Secondary Plan Number:[id number]

 

 

----------------------------------------------

Assessment Information

----------------------------------------------

----------------------------------------------

LACE

----------------------------------------------

LACE

 

Acuity / Level of Care        Answers:  Was the patient admitted              

                                        to hospital via the                   

                                        emergency                             

                                        department? Yes:                      

Comorbidities - select        Answers:  Chronic pulmonary disease             

all that apply                                                                

                                        Metastatic solid tumor                

Emergency dept visits in      Answers:  1                                     

last 6 months                                                                 

Score: 12

 

Date Signed:  01/03/2018 01:32 PM

Electronically Signed By:Sneha Moody RN

 

 

----------------------------------------------

[facility] CM Progress Note

----------------------------------------------

CM Note

 

CM Note                       

Notes:

Patient admitted for rib fractures that resulted from a fall. He is also having an acute on chronic 


COPD exacerbation. He is O2 dependent at baseline.



Patient lives at [facility]. Per PT notes, he took a cab to the hospital. PT has cleared him to 

go back to [facility]. I called [facility] and left a message informing them of his likely discharge 

tomorrow. CM available for any dishcharge needs. 

 

Date Signed:  01/04/2018 03:21 PM

Electronically Signed By:Lucrecia Garrison RN

 

 

----------------------------------------------

Case Management Discharge Plan Note

----------------------------------------------

Case Management Discharge

 

Discharge Order Complete?     Answers:  Yes                                   

Patient to Obtain             Answers:  Independently                         

Medications                                                                   

Transportation Arranged       Answers:  Taxi - Self Pay                       

Faxed Final Orders            Answers:  Yes                                   

Discharge Comments            

Notes:

Patient discharged back to his [facility]. He has been set up with [facility] PT/OT and 


RT arranged home O2 with Suzy Hidalgo called report to Argenis at the [facility]. 

 

Date Signed:  01/05/2018 02:17 PM

Electronically Signed By:Lucrecia Garrison RN

 

 

----------------------------------------------

Intervention Information

----------------------------------------------

Intervention Type:*ANDREW-Signed                       Date of Service:01/04/2018 11:59 AM

Patient Type:Observation                             Staff Member:Neeru Zambrano

Hours:                                               Discipline:

Severity:                                            Comment:

## 2018-01-07 NOTE — GCON
[f rep st]



                                                                    CONSULTATION





DATE OF CONSULTATION:  01/03/2018



HISTORY OF PRESENT ILLNESS:  The patient is a 72-year-old male who had a fall at home sustaining mult
iple right rib fractures.  Chest x-ray at that time reveals no pneumothorax or hemothorax.  He is com
plaining of some right chest pain as well as right shoulder pain.  X-rays reveal multiple right rib f
ractures which are minimally displaced.



REVIEW OF SYSTEMS:  Reveals no major medical problems unrelated to the HPI and past history.  He has 
also had multiple previous falls with rib fractures over the last few years.



MEDICATIONS:  Include omeprazole, Restoril, Aleve, Lasix, Benadryl, Lidoderm patches, morphine sulfat
e Contin, oxycodone, simethicone, gabapentin, Flexeril, and albuterol.



ALLERGIES:  Sulfa.



PAST MEDICAL HISTORY:  Includes prostate cancer with bone METS, hypertension, COPD, chronic pain issu
es, chronic ETOH issues, and long-time smoking.



PHYSICAL EXAMINATION:  GENERAL:  Reveals an alert, cooperative 72-year-old male, who looks older than
 the stated age.  HEAD AND NECK:  Reveals no evidence of trauma.  Pupils are equal and reactive.  The
re are no oral lesions.  NECK:  Supple and nontender.  CHEST:  Reveals symmetrical breath sounds.  He
 is quite tender over his right lateral 7th, 8th, and 9th ribs.  His clavicles are nontender despite 
the x-ray report of a possible distal clavicle fracture.  CARDIAC:  Reveals a regular rhythm without 
murmurs.  ABDOMEN:  Soft and nontender.  EXTREMITIES:  Benign with full range of motion, full pulses.
  Clavicles are specifically not tender.



IMPRESSION:  

1.  Multiple right rib fractures after a fall.  He seems to be doing quite well from that.  We will c
ontinue with some respiratory treatments and pulmonary hygiene.

2.  Questionable clavicle abnormality, although it is not clinically tender, and I do not suspect any
 clavicle fracture of note.





Job #:  [id number]

## 2018-03-08 ENCOUNTER — HOSPITAL ENCOUNTER (INPATIENT)
Dept: HOSPITAL 80 - FED | Age: 73
LOS: 6 days | DRG: 551 | End: 2018-03-14
Attending: FAMILY MEDICINE | Admitting: FAMILY MEDICINE
Payer: COMMERCIAL

## 2018-03-08 DIAGNOSIS — Z79.51: ICD-10-CM

## 2018-03-08 DIAGNOSIS — J96.21: ICD-10-CM
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
## 2018-03-08 DIAGNOSIS — F11.20: ICD-10-CM

## 2018-03-08 DIAGNOSIS — G89.29: ICD-10-CM

## 2018-03-08 DIAGNOSIS — M48.061: ICD-10-CM

## 2018-03-08 DIAGNOSIS — Z91.81: ICD-10-CM

## 2018-03-08 DIAGNOSIS — Z66: ICD-10-CM

## 2018-03-08 DIAGNOSIS — S32.059A: Primary | ICD-10-CM

## 2018-03-08 DIAGNOSIS — R26.81: ICD-10-CM

## 2018-03-08 DIAGNOSIS — R63.6: ICD-10-CM

## 2018-03-08 DIAGNOSIS — D64.9: ICD-10-CM

## 2018-03-08 DIAGNOSIS — M51.16: ICD-10-CM

## 2018-03-08 DIAGNOSIS — I10: ICD-10-CM

## 2018-03-08 DIAGNOSIS — J69.0: ICD-10-CM

## 2018-03-08 DIAGNOSIS — R00.1: ICD-10-CM

## 2018-03-08 DIAGNOSIS — C61: ICD-10-CM

## 2018-03-08 DIAGNOSIS — Z79.52: ICD-10-CM

## 2018-03-08 DIAGNOSIS — J44.9: ICD-10-CM
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
## 2018-03-08 DIAGNOSIS — Z99.81: ICD-10-CM

## 2018-03-08 DIAGNOSIS — G62.9: ICD-10-CM

## 2018-03-08 DIAGNOSIS — J15.211: ICD-10-CM

## 2018-03-08 DIAGNOSIS — F17.210: ICD-10-CM

## 2018-03-08 DIAGNOSIS — E87.0: ICD-10-CM

## 2018-03-08 DIAGNOSIS — R73.9: ICD-10-CM

## 2018-03-08 DIAGNOSIS — F10.231: ICD-10-CM

## 2018-03-08 DIAGNOSIS — T42.75XA: ICD-10-CM

## 2018-03-08 LAB
INR PPP: 1.01 (ref 0.83–1.16)
PLATELET # BLD: 211 10^3/UL (ref 150–400)
PROTHROMBIN TIME: 13.5 SEC (ref 12–15)

## 2018-03-08 PROCEDURE — A9585 GADOBUTROL INJECTION: HCPCS

## 2018-03-08 PROCEDURE — G0480 DRUG TEST DEF 1-7 CLASSES: HCPCS

## 2018-03-08 NOTE — EDPHY
H & P


Stated Complaint: Bilateral LE Pain x 4 days


Time Seen by Provider: 03/08/18 21:20


HPI/ROS: 





CHIEF COMPLAINT:  Bilateral thigh pain





HISTORY OF PRESENT ILLNESS:  The patient is a 72-year-old man who comes to the 

emergency department from [facility] complaining of bilateral 

leg pain in his thighs.  He states that his muscles seem weak and atrophied.  

He has a history of prostate cancer and according to previous records has bony 

metastasis but no details on which bones that I can find.  The patient denies 

this and states that he never had bony metastasis and that his initial prostate 

cancer was makes diagnosed as bone cancer.  He is now in remission.  He also 

has a history of COPD on 4 L of oxygen, alcoholism, chronic pain with opiate 

dependency, cervical spine stenosis and degenerative disc disease in his low 

back.  He denies bowel or bladder abnormalities.  He denies numbness or 

paresthesias.  No fevers or recent trauma.  He has been admitted multiple times 

for falls while intoxicated.  He denies any recent falls.  He states that his 

pain began about 5 days ago.  He called the ambulance on Tuesday but decided 

not to come at that point.





REVIEW OF SYSTEMS:


Constitutional:  denies: chills, fever, recent illness, recent injury


EENTM: denies: blurred vision, double vision, nose congestion


Respiratory: denies: cough, shortness of breath


Cardiac: denies: chest pain, irregular heart rate, lightheadedness, palpitations


Gastrointestinal/Abdominal: denies: abdominal pain, diarrhea, nausea, vomiting, 

blood streaked stools


Genitourinary: denies: dysuria, frequency, hematuria, pain


Musculoskeletal:  See HPI


Skin: denies: lesions, rash, jaundice, bruising


Neurological: denies: headache, numbness, paresthesia, tingling, dizziness, 

weakness


Hematologic/Lymphatic: denies: blood clots, easy bleeding, easy bruising


Immunologic/allergic: denies: HIV/AIDS, transplant








EXAM:


GENERAL:  Well-appearing, well-nourished and in no acute distress.


HEAD:  Atraumatic, normocephalic.


EYES:  Pupils equal round and reactive to light, extraocular movements intact, 

sclera anicteric, conjunctiva are normal.


ENT:  TMs normal, nares patent, oropharynx clear without exudates.  Moist 

mucous membranes.


NECK:  Normal range of motion, supple without lymphadenopathy or JVD.


LUNGS:  Breath sounds clear to auscultation bilaterally and equal.  No wheezes 

rales or rhonchi.


HEART:  Regular rate and rhythm without murmurs, rubs or gallops.


ABDOMEN:  Soft, nontender, normoactive bowel sounds.  No guarding, no rebound.  

No masses appreciated.


BACK:  Mild low back pain, no step-offs or tenderness.  No CVA pain


EXTREMITIES:  Patient complains of pain to both thighs right greater than left.

  Able to lift left leg off the bed but not right.


NEUROLOGICAL:  Cranial nerves II through XII grossly intact.  Normal speech, he 

is able to ambulate but with pain.  3/5 strength in right leg.  normal sensation


PSYCH:  Normal mood, normal affect.


SKIN:  Warm, dry, normal turgor, no visible rashes or lesions.








Source: Patient


Exam Limitations: No limitations





- Personal History


Current Tetanus Diphtheria and Acellular Pertussis (TDAP): Yes





- Medical/Surgical History


Hx Asthma: No


Hx Chronic Respiratory Disease: Yes


Hx Diabetes: No


Hx Cardiac Disease: Yes


Hx Renal Disease: No


Hx Cirrhosis: No


Hx Alcoholism: Yes


Hx HIV/AIDS: No


Hx Splenectomy or Spleen Trauma: No


Other PMH: Prostate cancer, HTN, is a life time smoker 3/4 ppd now , chronic 

etoh, narcs and benzos, chronic pain, pancratitis, etoh abuse, COPD





- Family History


Significant Family History: No pertinent family hx





- Social History


Smoking Status: Heavy smoker


Alcohol Use: Sober


Drug Use: None


Constitutional: 


 Initial Vital Signs











Temperature (C)  37 C   03/08/18 20:12


 


Heart Rate  74   03/08/18 20:12


 


Respiratory Rate  18   03/08/18 20:12


 


Blood Pressure  138/76 H  03/08/18 20:12


 


O2 Sat (%)  93   03/08/18 20:12








 











O2 Delivery Mode               Nasal Cannula


 


O2 (L/minute)                  4














Allergies/Adverse Reactions: 


 





Sulfa (Sulfonamide Antibiotics) [Sulfa(Sulfonamide Antibiotics)] Allergy (

Verified 01/03/18 11:37)


 








Home Medications: 














 Medication  Instructions  Recorded


 


Albuterol [Proventil Inhaler HFA 2 puffs IH Q4H PRN 03/01/17





(*)]  


 


Cyclobenzaprine [Flexeril 10 MG 10 mg PO Q6HRS PRN 03/01/17





(*)]  


 


Gabapentin [Neurontin 300 MG (*)] 900 mg PO TID 03/01/17


 


Omeprazole 40 mg PO DAILY 03/01/17


 


Temazepam [Restoril 15 MG (*)] 30 mg PO HS PRN 03/01/17


 


Potassium Cl [Klor-Con 20 meq (*)] 20 meq PO DAILY 01/03/18


 


morphINE SR [Ms Contin/Oramorph 15 15 mg PO TID 01/03/18





mg (*)]  


 


oxyCODONE IR [Oxycodone Ir (*)] 15 mg PO Q4HRS PRN #30 tab 01/05/18


 


Citalopram Hydrobromide [celeXA 10 10 mg PO DAILY 03/09/18





MG]  


 


Herbals/Supplements -Info Only 1 ea PO DAILY 03/09/18


 


Ibuprofen [Motrin (*)] 200 mg PO DAILY PRN 03/09/18


 


Tamsulosin HCl [Flomax 0.4 MG (*)] 0.4 mg PO DAILY 03/09/18














Medical Decision Making





- Diagnostics


Imaging Results: 


 Imaging Impressions





Lumbar Spine MRI  03/08/18 21:43


Impression:


 


1.  New compression fracture of L5, likely acute or subacute, with retropulsion 

of the posterior wall of the vertebral body which results in severe canal 

stenosis at this level.


2.  Canal stenosis, severe at L2-L3 and mild at L1-L2 and L3-L4. Possible 

tethering of the cauda equina at L2-L3.


3.  Foraminal stenosis, moderate on the right and mild on the left at L1-L2, 

severe on the right and moderate on the left at L2-L3 and L3-L4, and severe 

bilaterally at L4-L5.


 


Dr. Rodarte discussed these findings by telephone with LUCIANA SANTACRUZ on 3/8/

2018 at 2258 hours.


 











Imaging: Discussed imaging studies w/ On call Radiologist


ED Course/Re-evaluation: 





11:10 p.m. I discussed the case with Dr. Solorio who agreed with admission and 

steroids and will consult tomorrow.





11:15 p.m. I spoke with Dr. Vega who will admit to the medical service.


Differential Diagnosis: 





Partial list of the Differential diagnosis considered include but were not 

limited to;  radiculopathy, cauda equina, fracture and although unlikely based 

on the history and physical exam, I also considered infection, transverse 

myelitis, ischemia, aneurysm. 





- Data Points


Laboratory Results: 


 Laboratory Results





 03/08/18 22:00 





 03/08/18 22:00 








Medications Given: 


 





Ethyl Alcohol (Vodka)  50 ml PO TID GREGG


   Stop: 09/05/18 08:59


   Last Admin: 03/09/18 11:00 Dose:  50 ml





Discontinued Medications





Hydrocodone Bitart/Acetaminophen (Norco 5/325)  1 - 2 tab PO Q4HRS PRN


   PRN Reason: Pain, Moderate Able to Take PO


   Stop: 03/19/18 01:24


   Last Admin: 03/09/18 05:53 Dose:  2 tab


Dexamethasone (Decadron Injection)  10 mg IVP EDNOW ONE


   Stop: 03/08/18 23:18


   Last Admin: 03/08/18 23:58 Dose:  10 mg


Sodium Chloride (Ns)  1,000 mls @ 0 mls/hr IV ONCE ONE; Wide Open


   PRN Reason: Protocol


   Stop: 03/08/18 21:43


   Last Admin: 03/08/18 22:51 Dose:  1,000 mls


Ketamine HCl (Ketamine)  11.3 mg 0.2 mg/kg (11.3 mg) IVP EDNOW ONE


   Stop: 03/08/18 21:42


   Last Admin: 03/08/18 22:10 Dose:  Not Given


Ketorolac Tromethamine (Toradol)  15 mg IVP EDNOW ONE


   Stop: 03/08/18 23:21


   Last Admin: 03/08/18 23:58 Dose:  15 mg


Lorazepam (Ativan)  0.5 - 1 mg PO Q8HRS PRN


   PRN Reason: Anxiety, Able to Take PO


   Stop: 09/05/18 01:24


   Last Admin: 03/09/18 02:00 Dose:  1 mg








Departure





- Departure


Disposition: [facility] ([place] [place]s Inpatient Acute


Clinical Impression: 


 Bilateral leg weakness





Low back pain


Qualifiers:


 Chronicity: unspecified Back pain laterality: bilateral Sciatica presence: 

unspecified whether sciatica present Qualified Code(s): M54.5 - Low back pain





Condition: Fair

## 2018-03-09 LAB — PLATELET # BLD: 200 10^3/UL (ref 150–400)

## 2018-03-09 RX ADMIN — Medication SCH: at 15:54

## 2018-03-09 RX ADMIN — MORPHINE SULFATE SCH MG: 15 TABLET, FILM COATED, EXTENDED RELEASE ORAL at 15:43

## 2018-03-09 RX ADMIN — Medication SCH: at 09:17

## 2018-03-09 RX ADMIN — Medication SCH ML: at 11:00

## 2018-03-09 RX ADMIN — MORPHINE SULFATE SCH MG: 15 TABLET, FILM COATED, EXTENDED RELEASE ORAL at 22:15

## 2018-03-09 RX ADMIN — HYDROCODONE BITARTRATE AND ACETAMINOPHEN PRN TAB: 5; 325 TABLET ORAL at 05:53

## 2018-03-09 RX ADMIN — HYDROCODONE BITARTRATE AND ACETAMINOPHEN PRN TAB: 5; 325 TABLET ORAL at 01:59

## 2018-03-09 RX ADMIN — Medication SCH: at 22:16

## 2018-03-09 RX ADMIN — GABAPENTIN SCH MG: 300 CAPSULE ORAL at 22:15

## 2018-03-09 RX ADMIN — GABAPENTIN SCH MG: 300 CAPSULE ORAL at 15:43

## 2018-03-09 NOTE — ASMTCMCOM
CM Note

 

CM Note                       

Notes:

Chart reviewed. 72 year old male who lives at [facility], he is a current everyday smoker and 

alcohol user. He also takes pain medication and sleeping medication, Has sustained multiple 

falls.  He is on chronic oxygen at [facility]. He has been admitted vis ED for leg weakness and back 

pain. He is being evaluated by neurosurgery at this time. Therapy evaluations pending brace. CM to 

follow.

 

Date Signed:  03/09/2018 12:22 PM

Electronically Signed By:Miladis Alfaro RN

## 2018-03-09 NOTE — GHP
[f rep st]



                                                            HISTORY AND PHYSICAL





DATE OF ADMISSION:  03/08/2018



SOURCE:  Patient provides history with fair historian.  EMR was reviewed and case discussed with ED iva shin.



CHIEF COMPLAINT:  Back pain with bilateral lower extremity weakness.



HISTORY OF PRESENT ILLNESS:  This is a very pleasant 72-year-old gentleman with past medical history 
significant for degenerative disk disease with cervical and lumbar spine, no stenosis, COPD on 4 L ch
ronically of oxygen, history of alcoholism and chronic opiate dependence, prostate cancer, status pos
t treatment with Lupron ,history rib fractures, history of multiple falls, history of pancreatitis, h
ypertension, tobacco dependence, and neuropathy who presents the emergency department today with comp
laints of progressive and worsening bilateral thigh pain, weakness, and back pain.  Patient feels as 
though his legs are becoming increasingly weak proximally.  He feels like his muscle tone has decline
d.  He has had a history of multiple falls over the course of the month.  He denies any head injury o
r loss of consciousness at any of these times.  He denies any bladder or bowel incontinence or retent
ion issues.  The patient does report some right-sided radicular symptoms that run down to his foot on
 the left. He reports his strength on the left is slightly better than on the right.



REVIEW OF SYSTEMS:  GENERAL:  Patient denies any fevers or chills. SKIN:  No rashes or sores. ENT: No
 congestion sore throat.  EYES:  Patient does report history of right cataract with decreased vision 
and left eye with progressive changes.  He also reports intermittent floaters. CARDIOVASCULAR: Patien
t denies any chest pain or palpitations. RESPIRATORY: Patient reports his shortness of breath is at b
aseline.  He has a chronic cough.  GI:  Patient denies any nausea or vomiting.  No abdominal pain, no
 diarrhea.  :  No dysuria or hematuria. MUSCULOSKELETAL: Positive for back pain, bilateral thigh we
akness and pain as noted above in HPI. NEUROLOGIC: Numbness and tingling down the right leg.  No foca
l deficits.  PSYCHIATRIC: Patient does report some depressed mood with all his multiple chronic issue
s, but he absolutely denies any SI or HI and no plans to harm himself.



ALLERGIES:  Sulfa.



HOME MEDICATIONS:  Oxy IR 50 mg p.o. q.4 hours p.r.n. for pain, morphine SR 50 mg p.o. three times da
cisco, temazepam 30 mg p.o. at nighttime, tamsulosin 0.4 mg p.o. daily, potassium Klor-Con 20 mEq p.o. 
daily, omeprazole 40 mg p.o. daily, ibuprofen 200 mg p.o. daily p.r.n., gabapentin 900 mg p.o. three 
times daily, cyclobenzaprine 10 mg p.o. q.6 hours p.r.n., citalopram 10 mg p.o. daily, albuterol HFA 
2 puffs inhaled q.4 hours p.r.n.



PAST MEDICAL HISTORY:  

1.  Significant for prostate cancer status post Lupron therapy.  Patient with previous bone scans hemant
t did show areas of increased activity, but subsequent CT scan imaging without any notation of any ev
idence of metastatic disease.  The patient also describes that the initial impression of potentially 
metastatic disease was in error.  He reports he has had subsequent imaging studies and bone scans hemant
t are negative for any evidence of metastatic disease. 

2.  COPD on 4 L/minute oxygen. 

3.  Degenerative disk disease. 

4.  Cervical and lumbar spine stenosis.

5.  Rib fractures. 

6.  Hypertension. 

7.  Positive tobacco dependence. 

8.  Neuropathy in both feet. 

9.  Pancreatitis. 

10.  Falls. 

11.  Alcohol dependence.



PAST SURGICAL HISTORY:  Patient denies.



FAMILY HISTORY:  Sister is healthy.  Cousins with degenerative disk disease.



SOCIAL HISTORY:  Patient reports he has cut back on his tobacco to 3/4 pack per day for 59 years.  He
 does drink 3 times daily a small amount of rum and coke.



CODE STATUS:  Patient desires to be a DNR/DNI.



PHYSICAL EXAMINATION:  VITAL SIGNS:  Upon arrival to the emergency department, blood pressure 138/78,
 heart rate is 74, respiratory rate 18, O2 saturation is 93% on 3 L by nasal cannula, temperature is 
37.0. Vitals, blood pressure 154/77, heart rate 63, respiratory rate is 16, O2 saturation 91% 4 L by 
nasal cannula, temperature is 36.4.  GENERAL:  No acute distress.  Pleasant, elderly, frail-appearing
 gentleman lying quietly in bed.  HEAD:  Normocephalic, atraumatic. EYES:  Extraocular muscles intact
.  Pupils equal, round, with decreased reactivity to light bilaterally but symmetric. No scleral icte
laney or conjunctival injection.  ENT:  Mucous membranes appear moist.  No oropharyngeal erythema or ex
udates.  NECK:  Supple.  Trachea midline.  CARDIOVASCULAR:  Regular rate and rhythm.  No murmurs, rub
s, or gallops appreciated.  Slightly distant heart sounds.  RESPIRATORY:  Diminished at the bases bertha
aterally.  No wheezes, rales, or rhonchi appreciated.  Unlabored breathing.  ABDOMEN: Positive bowel 
sounds.  Soft, nontender to palpation.  No rebound, guarding, or masses appreciated.  :  No suprapu
bic tenderness to palpation.  No Starr catheter in place. EXTREMITIES:  Patient with slightly decreas
ed strength in the lower extremities bilaterally.  4/5 strength in upper and lower extremities overal
l.  Slightly more so on the right proximal leg compared to the left, however.  Sensation is intact.  
Slightly diminished at the distal extremities. NEUROLOGIC: Grossly nonfocal. No facial drooping.  Cra
nial nerves intact, symmetric bilaterally. PSYCHIATRIC: Thought process, content and questions are ap
propriate.  The patient's mood does appear to be slightly decreased, diminished, but he is otherwise 
in fairly good spirits and interactive.



LABORATORY STUDIES:  WBC 9.86, H and H is 11.9 and 35.8, MCV 89.3, platelet count is 211, neutrophil 
percent 80.1%, no bands.  PT is 13.5, INR is 1.01, PTT 33.93.  Sodium 138, potassium 4.7, chloride 10
0, CO2 is 26, anion gap 12, BUN 11, creatinine 0.7. GFR greater than 60, glucose is 83, calcium is 8.
8. A pH of 6.0, specific gravity 1.008, no bands, otherwise negative. 



Alcohol level of 21. 



Lumbar spine MRI, see report for full details.  Right renal cyst with septations, similar to previous
 exam.  Posterior paraspinal muscles.  Mild fatty atrophy inferiorly.  Some __________ cauda equina s
uperior to L2-3 region suggesting tethering. 



Vertebrae moderate compression deformity L5, new since previous studies.  Mild associated T2 hyperint
ensity suggesting acute versus subacute.  Retropulsion of the posterior wall of the vertebral body re
sults in severe spinal canal stenosis at this level.  Mild chronic superior endplate deformity L3, no
t significantly changed.  Degenerative changes noted including moderate disk loss height T12-L1, megha
re loss disk height at L1-L2, L2-3, moderate loss of disk height and 2 mm of retrolisthesis.  Disk bu
lge resulting in severe canal stenosis, severe right, moderate left foraminal stenosis L4-5, disk bul
ge resulting in severe bilateral foraminal stenosis.  L5-S1 mild bilateral facet arthropathy without 
changes of foraminal stenosis.



ASSESSMENT AND PLAN:  Pleasant 72-year-old gentleman who presents to the emergency department with co
mplaints of bilateral thigh pain and weakness as well as back pain.

1.  Thigh weakness.  Patient does demonstrate a little bit of atrophy.  The patient does have an acut
e versus subacute L5 compression fracture.  Continues to complain of some pain.  K-pad pain managemen
t and Lidoderm patch available p.r.n.  Discussion with patient regarding metastatic disease reported 
to have been false for the patient and reports that he has since had clearance from his disease.  Joe
rosurgery was consulted for further evaluation of patient and for recommendations.  He has not made n
.p.o.  Recommendation is for pan CT to further evaluate for any potential recurrence of metastatic di
sease.  PT, OT will be consulted.

2.  Acute on chronic back pain.  Pain management is available for the patient p.r.n.  Ativan, Dilaudi
d, Norco, Tylenol p.r.n.  Also, patient was given the 10 mg of Decadron in the emergency department t
o see if this would alleviate some of his symptoms.  We will plan to reassess in the morning.

3.  Leg weakness. PT, OT.  Plan as noted above.

4.  Chronic anemia, likely of chronic disease.

5.  Hyperglycemia following steroids.  Continue to monitor.  No need for insulin sliding scale at thi
s time.

6.  History of prostate cancer with questionable metastatic disease, reported in remission.

7.  Cervical spine stenosis. 

8.  Degenerative disk disease in lumbar spine and cervical spine.

9.  Chronic obstructive pulmonary disease, on 4 L/minute.

10.  Alcohol dependence.  Cessation or decreasing alcohol intake was advised.

11.  Benign essential hypertension.  Continue to monitor. Acceptable at this time.

12.  Positive tobacco dependence. Cessation encouraged.

13.  Neuropathy bilateral feet.  Supportive care, Lyrica p.r.n.

14.  Prophylaxis, sequential compression devices.  Lovenox appropriate.

15.  Code status, DNR/DI, and patient denies any suicidal ideation or homicidal ideation.  He does ha
ve advanced directives completed and they are in the chart from his skilled nursing facility.

16.  Disposition. Patient admitted to observation status at this time pending neurosurgical recommend
ations.





Job #:  [id number]

## 2018-03-09 NOTE — PDMN
Medical Necessity


Medical necessity: C/M review:  est. > 2 MN LOS for eval and TX of new l5 

compression fracture, acute and persistent bilateral lower extremity weakness, 

thigh pain, acute on chronic back pain which occurs mainly with ambulation, , 

inability to ambulate due to pain, hyperglycemia, gait instability, requiring 

lumbar brace ordered, possible sliding scale insulin, ongoing pain management 

with multiple oral medications, pulse oximetry, supplemental O2, blood glucose 

monitoring, acute inpt PT/OT, comorbid regular alcohol use, underweight with 

BMI 19.6, hypertension, nicotine dependence, COPD on chronic 4l/min O2, , 

history of frequent falls at home, degenerative disc disease, alcoholism, 

chronic opiate dependence, prostate cancer per 3/9/2018 Hospitalist progress 

note.

## 2018-03-09 NOTE — GCON
[f 
rep st]



                                                                    CONSULTATION





NEUROSURGICAL CONSULTATION



DATE OF CONSULTATION:  03/09/2018





CHIEF COMPLAINT:  Low back and bilateral leg pain and weakness.



HISTORY OF PRESENT ILLNESS:  The patient is a pleasant 72-year-old gentleman 
with a history of alcoholism and prostate cancer, who lives at the [facility].  He states that 2 days ago, he developed severe pain in 
his lower buttocks and right greater than left lateral thighs and calves.  He 
states that approximately 7 days ago, he was assisting a friend who had fallen 
and while picking them up, he lost his balance and he too fell down.  He states 
that he noticed a sharp pain in his right leg and then had difficulty walking 
on it secondary to this pain. 



Currently, he is lying comfortably in the hospital bed, moves his legs easily, 
and states that his leg pain has improved, but is still present, primarily 
localized to the right inferior buttock, right lateral thigh, and right lateral 
calf to the ankle, and to a lesser degree in the same distribution in the left 
leg, as well as pain in his "tailbone."



ALLERGIES:  Sulfa.



HOME MEDICATIONS:  Albuterol inhaler 2 puffs every 4 hours as needed, 
cyclobenzaprine 10 mg p.o. t.i.d., gabapentin 900 mg p.o. t.i.d., omeprazole 40 
mg daily, Restoril 30 mg p.o. q.h.s. as needed, Lasix 20 mg p.o. daily, 
potassium 20 mg p.o. daily, simethicone 125 mg p.o. q.6 hours p.r.n., MS Contin 
15 mg p.o. b.i.d., lidocaine patches as needed, Oxy IR 15 mg p.o. q.4 hours 
p.r.n., and prednisone 40 mg p.o. daily.



PHYSICAL EXAMINATION:  GENERAL:  Pleasant, elderly appearing 72-year-old male, 
in no apparent distress.  HEAD, EARS, NOSE AND THROAT:  Within normal limits.  
EXTREMITIES:  Within normal limits.  He does have some mild scrapes across his 
mid back, but no open sores.  NEUROLOGIC:  The patient is awake, alert, and 
oriented x4.  Cranial nerves 2 through 12 are intact to gross examination.  
Speech is fluent.  Tongue is midline.  Spinal accessory muscles are intact.  He 
has equal and symmetric strength of the bilateral upper and lower extremities 
in all muscle groups and is able to straight-leg raise without difficulty, both 
legs off the bed.  He has 5/5 in the quads, hamstrings, dorsiflexors, 
plantarflexors, iliopsoas, extensor hallucis longus, and gastrocnemius 
bilaterally.  Sensation is grossly intact to light touch in all dermatomal 
distributions of the bilateral upper and lower extremities.  Reflexes are 
absent in the bilateral biceps and brachioradialis.  There is no Duong's, and 
there are 1/4 bilateral patellar tendon reflexes.



LABORATORY RESULTS:  White blood cell count 8.06, hemoglobin 12.2, hematocrit 
36.5, platelets 200.  INR is 1.01.  Sodium 138, potassium 4.7, chloride 103, 
carbon dioxide 27, BUN 13, creatinine 0.6.  Ethyl alcohol level is 21.  
Urinalysis is negative.



REVIEW OF SYSTEMS:  Negative other than mentioned in the HPI.



PAST MEDICAL HISTORY:  Prostate cancer, COPD, alcoholism, chronic pain, opioid 
dependency, low back pain.



FAMILY HISTORY:  Noncontributory.



SOCIAL HISTORY:  The patient lives at an assisted living facility.  He smokes 
less than a pack a day and drinks alcohol daily.



DATA REVIEW:  MRI of the lumbar spine from 03/08/2018 without contrast 
demonstrates a new compression fracture of L5, which is likely acute, with 
retropulsion of the posterior wall of the vertebral body resulting in severe 
canal stenosis.  Severe canal stenosis is also noted at L2-3, with mild 
stenosis at L1-2 and L3-4.  Per the radiology report, there is possible 
tethering of the cauda equina at L2-3.  Foraminal stenosis is noted to be 
moderate on the right and mild on the left at L1-2, severe on the right and 
moderate on the left at L2-3 and L3-4, and severe bilaterally at L4-5.  The 
sacrum appears symmetric, with no signal abnormalities.



IMPRESSION:  This is a 72-year-old male with a fall approximately 7 days ago 
and the onset of sacral pain and bilateral right greater than left leg pain in 
an L5 distribution.  Neurologically, the patient is intact, with no bowel or 
bladder changes, paresthesias, hypoesthesias, or weakness.  His main complaint 
at this time is right greater than left leg pain.  He does move easily 
throughout the bed and is able to sit up and dangle his legs at the bedside 
without appreciable pain.



PLAN:  All of the above issues were discussed with the patient in detail today, 
with Dr. Mendoza who was present.  At this time, we recommend that the patient 
undergo a metastatic workup, and I have ordered a CT of the chest, abdomen, and 
pelvis.  I have also ordered an MRI of the lumbar spine with contrast to 
further clarify the findings in the lumbar spine and given his history of 
prostate cancer.  I recommend the patient be fitted with an LSO brace, and I 
have ordered that  be contacted for him to be fitted with this today.  He 
may work with physical therapy and occupational therapy once that brace is in 
place.  We recommend pain management, and we will follow his progress.  Dr. Mendoza did explain that the patient would be a candidate for fusion surgery 
should he fail to improve with conservative measures.  The patient would like 
to think about all of these options before agreeing to any surgical 
intervention.  We agree that this would be fine and see how he progresses as 
mentioned with conservative modalities.





Job #:  [id number]

MTDD

## 2018-03-09 NOTE — HOSPPROG
Hospitalist Progress Note


Assessment/Plan: 





Patient is a 72-year-old male with a past medical history significant for 

degenerative disc disease, COPD on 4 L of oxygen, history of alcoholism and 

chronic opiate dependence, prostate cancer.  And multiple medical issues.  He 

presented the emergency room with back pain and bilateral lower extremity 

weakness.  Today is my 1st encounter with the patient.  Chart reviewed.  

Appreciate neurosurgery seeing the patient.





* bilateral weakness as well as thigh pain.


-reviewed the note from Neurosurgery.


-no metastatic disease was acutely noted


-he has a new L5 compression fracture


-a brace has been ordered


-has underlying DDD





*acute on chronic back pain


-resumed his home regimen


-his pain occurs mainly when he is walking





*regular alcohol use


-Vodka TID


-he doesn't want to stop drinking





*Underweight with a BMI of 19.6





*HTN


-bp elevated; will cont monitoring





*nicotine dependence


-nicotine patch ordered





*COPD


-not in exacerbation





*hyperglycemia


-may need sliding scale





*gait instability


-patient falls frequently at home/ doesn't think the alcohol or pain meds are 

affecting him. Say it is due to his recliner and tripping on the oxygen.





*Plan: patient will require another midnight stay. This will make him IP stay.  

Can't walk due to pain. High risk of falling again.  





*


Subjective: Kush is not having pain while sitting still.


Objective: 


 Vital Signs











Temp Pulse Resp BP Pulse Ox


 


 36.4 C   61   18   179/82 H  94 


 


 03/09/18 11:19  03/09/18 11:19  03/09/18 11:19  03/09/18 11:19  03/09/18 11:19








 Laboratory Results





 03/09/18 04:32 





 03/09/18 04:32 





 











 03/08/18 03/09/18 03/10/18





 05:59 05:59 05:59


 


Intake Total  1000 


 


Output Total  200 


 


Balance  800 








 











PT  13.5 SEC (12.0-15.0)   03/08/18  22:00    


 


INR  1.01  (0.83-1.16)   03/08/18  22:00    














- Physical Exam


Constitutional: chronically ill appearing, cachectic


Eyes: PERRL


Ears, Nose, Mouth, Throat: hearing normal


Cardiovascular: regular rate and rhythym


Respiratory: no respiratory distress, reduced air movement (bibasilar)


Skin: warm


Musculoskeletal: generalized weakness


Neurologic: AAOx3


Psychiatric: interacting appropriately





ICD10 Worksheet


Patient Problems: 


 Problems











Problem Status Onset


 


Bilateral leg weakness Acute  


 


Low back pain Acute  


 


Accidental fall from chair Acute  


 


Acute encephalopathy Acute  


 


Acute hyperactive alcohol withdrawal delirium Acute  


 


Altered mental status Acute  


 


Aspiration into airway Acute  


 


Chronic pain Acute  


 


Closed right clavicular fracture Acute  


 


Dehydration Acute  


 


Failure to thrive Acute  


 


Generalized weakness Acute  


 


History of fall Acute  


 


Hypoxia Acute  


 


Multiple fractures of ribs of right side Acute  


 


Opiate dependence, continuous Acute  


 


Palliative care encounter Acute  


 


Pancreatitis Acute  


 


Pneumonia Acute  


 


Pneumonia Acute  


 


Polypharmacy Acute  


 


Polysubstance abuse Acute  


 


Polysubstance dependence including opioid drug with daily use Acute  


 


Recurrent falls Acute  


 


Rib fractures Acute  


 


Thoracic compression fracture Acute

## 2018-03-10 RX ADMIN — CITALOPRAM HYDROBROMIDE SCH: 20 TABLET ORAL at 09:23

## 2018-03-10 RX ADMIN — THERA TABS SCH: TAB at 09:23

## 2018-03-10 RX ADMIN — DEXMEDETOMIDINE HYDROCHLORIDE SCH MLS: 100 INJECTION, SOLUTION, CONCENTRATE INTRAVENOUS at 18:26

## 2018-03-10 RX ADMIN — Medication SCH ML: at 02:17

## 2018-03-10 RX ADMIN — DEXMEDETOMIDINE HYDROCHLORIDE SCH MLS: 100 INJECTION, SOLUTION, CONCENTRATE INTRAVENOUS at 12:29

## 2018-03-10 RX ADMIN — Medication SCH: at 09:23

## 2018-03-10 RX ADMIN — SODIUM CHLORIDE SCH MLS: 900 INJECTION, SOLUTION INTRAVENOUS at 17:26

## 2018-03-10 RX ADMIN — DEXMEDETOMIDINE HYDROCHLORIDE SCH MLS: 100 INJECTION, SOLUTION, CONCENTRATE INTRAVENOUS at 04:56

## 2018-03-10 RX ADMIN — POTASSIUM CHLORIDE SCH: 1500 TABLET, EXTENDED RELEASE ORAL at 09:23

## 2018-03-10 RX ADMIN — FOLIC ACID SCH: 1 TABLET ORAL at 09:23

## 2018-03-10 RX ADMIN — HALOPERIDOL LACTATE PRN MG: 5 INJECTION, SOLUTION INTRAMUSCULAR at 17:27

## 2018-03-10 RX ADMIN — TAMSULOSIN HYDROCHLORIDE SCH: 0.4 CAPSULE ORAL at 09:23

## 2018-03-10 RX ADMIN — HALOPERIDOL LACTATE PRN MG: 5 INJECTION, SOLUTION INTRAMUSCULAR at 21:36

## 2018-03-10 NOTE — NEUSURGPN
Assessment/Plan: 





A: 71 yo M with hx of ETOH abuse, admitted with acute L5 fx after fall 1 week 

ago. Has hx of COPD, chronic pain, smoker.





P:


-Transferred to ICU for CIWA protocol, on precedex/ativan


-Imaging reviewed and no evidence of bony metastasis, has prior hx of prostate 

cancer


-MRI L spine with severe spinal stenosis at L5/S1 with acute L5 fx


-Recommend brace when OOB - to be fitted by 


-Reviewed plan with pt's RN


-D/w Dr Mendoza


-Please call NS with any questions or concerns


Subjective: 


Unable to obtain


Objective: 


Sleepy but awakens easily


Moaning, no intelligible speech


VSS


On precedex


MAEx4, has restraints BUE and is trying to get out of bed


He did follow commands for me with BLE and is 5/5 with no deficits noted


Urinary Catheter in Place: No





- Physician


Discussed Patient with Dr.: Mendoza





Neurosurgery Physical Exam





- Vitals, I&O, Labs





 I and O











 03/09/18 03/10/18 03/11/18





 05:59 05:59 06:59


 


Intake Total  280 


 


Output Total  0 


 


Balance  280 


 


Weight  60.8 kg 57 kg


 


Intake:   


 


  Oral (ml)  280 


 


Output:   


 


  Urine (ml)  0 


 


    Toilet  0 


 


Other:   


 


  Intake Quantity  Yes 





  Sufficient   


 


  Number of Voids   


 


    Toilet  1 


 


  Number of Stools   


 


    Toilet  1 








 Vital Signs











Temp Pulse Resp BP Pulse Ox


 


 36.9 C   53 L  10 L  176/73 H  93 


 


 03/10/18 04:00  03/10/18 11:00  03/10/18 11:00  03/10/18 11:00  03/10/18 11:00














ICD10 Worksheet


Patient Problems: 


 Problems











Problem Status Onset


 


Bilateral leg weakness Acute  


 


Low back pain Acute  


 


Accidental fall from chair Acute  


 


Acute encephalopathy Acute  


 


Acute hyperactive alcohol withdrawal delirium Acute  


 


Altered mental status Acute  


 


Aspiration into airway Acute  


 


Chronic pain Acute  


 


Closed right clavicular fracture Acute  


 


Dehydration Acute  


 


Failure to thrive Acute  


 


Generalized weakness Acute  


 


History of fall Acute  


 


Hypoxia Acute  


 


Multiple fractures of ribs of right side Acute  


 


Opiate dependence, continuous Acute  


 


Palliative care encounter Acute  


 


Pancreatitis Acute  


 


Pneumonia Acute  


 


Pneumonia Acute  


 


Polypharmacy Acute  


 


Polysubstance abuse Acute  


 


Polysubstance dependence including opioid drug with daily use Acute  


 


Recurrent falls Acute  


 


Rib fractures Acute  


 


Thoracic compression fracture Acute

## 2018-03-10 NOTE — HOSPPROG
Hospitalist Progress Note


Assessment/Plan: 





#Alchohol w/d: wean off precedex. Cont CIWA. Check LFTs





#Acute on chronic back pain: L5 compression fracture, severe spinal stenosis L4/

5. No e/o bony lesions with h/o prostate cancer. NSGY reviewed. Brace when out 

of bed





#Falls: related to Etoh





#HTN: not on outpatient meds





#COPD: no signs of exacerbation





#Bradycardia: related to Precedex, wean today





#Chronic hypoxemic resp failure: 3L at baseline





#Chronic pain: MS contin at home. Give low-dose IV here since not taking PO





#Diet: NPO for now, IVFs





#DVT ppx: Lovenox





#Disp: warrants ICU care with etoh w/d, requiring CIWA


Subjective: agitated this morning. Bradycardic on precedex


Objective: 


 Vital Signs











Temp Pulse Resp BP Pulse Ox


 


 36.9 C   51 L  19   143/98 H  97 


 


 03/10/18 04:00  03/10/18 09:00  03/10/18 09:00  03/10/18 09:00  03/10/18 09:00








 











 03/09/18 03/10/18 03/11/18





 05:59 05:59 06:59


 


Intake Total  280 


 


Output Total  0 


 


Balance  280 








 











PT  13.5 SEC (12.0-15.0)   03/08/18  22:00    


 


INR  1.01  (0.83-1.16)   03/08/18  22:00    














- Physical Exam


Constitutional: uncomfortable, cachectic, other (restless, trying to get out of 

bed)


Ears, Nose, Mouth, Throat: moist mucous membranes


Cardiovascular: bradycardia


Respiratory: no respiratory distress


Gastrointestinal: normoactive bowel sounds


Genitourinary: no bladder fullness


Musculoskeletal: other (roller belt in place)


Neurologic: CN II-XII Intact, other (thinks he's at a resort. Not oriented to 

place or time)


Psychiatric: encephalopathic, anxious, agitated





ICD10 Worksheet


Patient Problems: 


 Problems











Problem Status Onset


 


Pneumonia Acute  


 


Polysubstance dependence including opioid drug with daily use Acute  


 


Altered mental status Acute  


 


Opiate dependence, continuous Acute  


 


Acute encephalopathy Acute  


 


Aspiration into airway Acute  


 


Polypharmacy Acute  


 


Acute hyperactive alcohol withdrawal delirium Acute  


 


Palliative care encounter Acute  


 


Rib fractures Acute  


 


Chronic pain Acute  


 


Pancreatitis Acute  


 


Dehydration Acute  


 


History of fall Acute  


 


Pneumonia Acute  


 


Thoracic compression fracture Acute  


 


Polysubstance abuse Acute  


 


Generalized weakness Acute  


 


Failure to thrive Acute  


 


Recurrent falls Acute  


 


Multiple fractures of ribs of right side Acute  


 


Accidental fall from chair Acute  


 


Closed right clavicular fracture Acute  


 


Hypoxia Acute  


 


Low back pain Acute  


 


Bilateral leg weakness Acute

## 2018-03-10 NOTE — GCON
[f rep st]



                                                                    CONSULTATION





PULMONARY CRITICAL CARE CONSULTATION.



DATE OF CONSULTATION:  03/10/2018



REASON FOR CONSULTATION:  Alcohol withdrawal.



HISTORY:  The patient is a 72-year-old who was admitted to the hospital on 03/08 for increasing back 
pain radiating into his thighs and calves.  This was associated with some subjective weakness as well
 of the lower extremities.  Seven days prior he had fallen.  He has a history of multiple medical pro
blems, as outlined below in the past medical history.  One of these is ongoing alcohol abuse.  He dri
nks 3 glasses of rum on a daily basis.  Following admission he was seen by Neurosurgery.  MRI was ord
ered.  There was severe spinal stenosis at L5-S1 with an L5 acute fracture.  A brace was recommended.
  He was placed on the CIWA protocol, given p.r.n. Ativan and alcohol was scheduled as well as he had
 no intentions of quitting drinking.  Yesterday he started to refuse further alcohol.  He had signs a
nd symptoms of increasing alcohol withdrawal, received.  frequent higher doses of Ativan, and was mov
ed to the intensive care unit as a stat call.  Secondary to large doses of Ativan he did require Prec
edex.  



He is now somnolent in the intensive care unit, mildly hypertensive, but otherwise with stable vital 
signs.



PAST MEDICAL HISTORY:  Remarkable for COPD and ongoing tobacco abuse.  There is a history of prostate
 cancer, status post Lupron therapy, without evidence of active disease at this time.  He has a histo
ry of cervical as well as lumbar spinal stenosis and degenerative disk disease, systemic hypertension
, a history of pancreatitis secondary to alcohol, neuropathy, and a history of falling.



MEDICATIONS ON ADMISSION:  Included Celexa, Flomax, potassium, omeprazole, high-dose gabapentin, Flex
eril, albuterol by meter dose inhaler as needed, oxycodone IR and morphine SR and Restoril at night. 
 He is on oxygen at 4 L.



ALLERGIES:  Sulfa preparations.



SOCIAL HISTORY:  He lives at [facility].  He continues to smoke and drink as noted in 
the HPI.  He is currently smoking less than 1 pack of cigarettes per day.



FAMILY HISTORY:  Noncontributory per the chart/unobtainable.



REVIEW OF SYSTEMS:  Unobtainable.



PHYSICAL EXAMINATION:  GENERAL:  Reveals a thin gentleman who appears older than his stated age.  He 
is somnolent and weakly arousable.  VITAL SIGNS:  Blood pressure is approximately 175/70, heart rate 
50 with sinus bradycardia on the monitor, respiratory rate is 18.  On 5 L of oxygen saturations are 9
8%.  HEENT:  Remarkable for dry mucous membranes.  Pupils appear equal.  CHEST:  Clear bilaterally wi
th decreased breath sounds and a prolonged expiratory phase.  There are no wheezes or rhonchi.  HEART
:  Regular in rate and rhythm although bradycardic.  A systolic murmur is present.  ABDOMEN:  Scaphoi
d, soft, and not apparently tender.  Bowel sounds are present but diminished.  No Starr catheter is i
n place.  EXTREMITIES:  There is trace lower extremity edema, no obvious cords.  SKIN:  Pale, without
 rash or lesion.  NEUROLOGIC:  Examination is nonfocal.  He moves extremities to stimulation.  He is 
currently somnolent, not verbal.



DATABASE:  CT scan of the chest on admission showed emphysema, small pulmonary nodule in the left upp
er lobe and some areas of subsegmental atelectasis, probably associated with some mucus plugging.  Th
ere is no evidence of metastatic prostate disease.  An old T6 compression fracture was noted.  



Abdominal CT scan was negative as well.  There was no evidence of pancreatitis or cholelithiasis.  Th
e pancreas appeared normal.  The acute L5 compression fracture is as noted from the patient's MRI.



LABORATORY:  White blood cell count is 8000, hematocrit 36, platelets 200,000.  PT and PTT were kiki
l on admission.  Basic metabolic panel is within normal limits with the exception of a mildly elevate
d glucose.  Urinalysis was negative.  Blood alcohol on admission was 21.



ASSESSMENT:  

1.  Alcohol withdrawal/delirium tremens.  The patient did develop significant withdrawal symptoms, re
ceived high-dose Ativan and required Precedex.  We have a new alcohol withdrawal protocol.  For indiv
iduals on Precedex, Ativan 2 mg IV q.6 hours should be given on a scheduled basis to prevent seizures
.  Intravenous versed should be used for acute agitation.  Precedex should be weaned as tolerated.

2.  L5 compression fracture.  This is associated with severe spinal stenosis and back pain.  Neurosur
pamela is following.  A brace has been ordered to be worn when he is out of bed.

3.  Chronic obstructive pulmonary disease.  He is chronically on oxygen at 4 L, uses albuterol alone.
 

4.  History of chronic back pain, on chronic narcotics.

5.  History of hypertension.  Blood pressures are acceptable.  He is on no antihypertensives.

6.  Deep venous thrombosis prophylaxis, on Lovenox.

7.  Gastrointestinal prophylaxis, will reinitiate omeprazole which he is on at home.



PLAN AND RECOMMENDATIONS:  The patient will be kept in the intensive care unit.  Ativan will be added
 routinely, Precedex weaned as tolerated.  Thiamin will be continued.  The CIWA protocol will be cont
inued.  His usual outpatient medications will be maintained if he can swallow safely.  These include 
Neurontin, Flexeril, Flomax and Celexa.  Saline will be continued.  Pain control will be maintained. 
 Swallow will be assessed at the bedside.  Morphine is ordered p.r.n. if he needs it and cannot take 
p.o. medications.  Laboratory will be followed. 



Further plans and recommendations will be made based on his progress over the next 12-24 hours.





Job #:  [id number]

## 2018-03-11 LAB — PLATELET # BLD: 239 10^3/UL (ref 150–400)

## 2018-03-11 RX ADMIN — MIDAZOLAM PRN MG: 1 INJECTION INTRAMUSCULAR; INTRAVENOUS at 13:25

## 2018-03-11 RX ADMIN — DEXMEDETOMIDINE HYDROCHLORIDE SCH MLS: 100 INJECTION, SOLUTION, CONCENTRATE INTRAVENOUS at 19:00

## 2018-03-11 RX ADMIN — TAMSULOSIN HYDROCHLORIDE SCH: 0.4 CAPSULE ORAL at 09:13

## 2018-03-11 RX ADMIN — SODIUM CHLORIDE SCH MLS: 900 INJECTION, SOLUTION INTRAVENOUS at 14:55

## 2018-03-11 RX ADMIN — MIDAZOLAM PRN MG: 1 INJECTION INTRAMUSCULAR; INTRAVENOUS at 03:51

## 2018-03-11 RX ADMIN — MIDAZOLAM PRN MG: 1 INJECTION INTRAMUSCULAR; INTRAVENOUS at 09:36

## 2018-03-11 RX ADMIN — MIDAZOLAM PRN MG: 1 INJECTION INTRAMUSCULAR; INTRAVENOUS at 19:29

## 2018-03-11 RX ADMIN — FOLIC ACID SCH: 1 TABLET ORAL at 09:12

## 2018-03-11 RX ADMIN — Medication SCH MLS: at 21:00

## 2018-03-11 RX ADMIN — HALOPERIDOL LACTATE PRN MG: 5 INJECTION, SOLUTION INTRAMUSCULAR at 06:45

## 2018-03-11 RX ADMIN — MIDAZOLAM PRN MG: 1 INJECTION INTRAMUSCULAR; INTRAVENOUS at 16:39

## 2018-03-11 RX ADMIN — DEXMEDETOMIDINE HYDROCHLORIDE SCH MLS: 100 INJECTION, SOLUTION, CONCENTRATE INTRAVENOUS at 00:32

## 2018-03-11 RX ADMIN — HALOPERIDOL LACTATE PRN MG: 5 INJECTION, SOLUTION INTRAMUSCULAR at 17:28

## 2018-03-11 RX ADMIN — CITALOPRAM HYDROBROMIDE SCH: 20 TABLET ORAL at 09:14

## 2018-03-11 RX ADMIN — THERA TABS SCH: TAB at 09:13

## 2018-03-11 RX ADMIN — HALOPERIDOL LACTATE PRN MG: 5 INJECTION, SOLUTION INTRAMUSCULAR at 01:37

## 2018-03-11 RX ADMIN — SODIUM CHLORIDE SCH MLS: 900 INJECTION, SOLUTION INTRAVENOUS at 03:55

## 2018-03-11 RX ADMIN — DEXMEDETOMIDINE HYDROCHLORIDE SCH MLS: 100 INJECTION, SOLUTION, CONCENTRATE INTRAVENOUS at 13:37

## 2018-03-11 RX ADMIN — HALOPERIDOL LACTATE PRN MG: 5 INJECTION, SOLUTION INTRAMUSCULAR at 10:58

## 2018-03-11 RX ADMIN — MIDAZOLAM PRN MG: 1 INJECTION INTRAMUSCULAR; INTRAVENOUS at 19:15

## 2018-03-11 RX ADMIN — DEXMEDETOMIDINE HYDROCHLORIDE SCH MLS: 100 INJECTION, SOLUTION, CONCENTRATE INTRAVENOUS at 06:36

## 2018-03-11 RX ADMIN — ENOXAPARIN SODIUM SCH MG: 100 INJECTION SUBCUTANEOUS at 11:21

## 2018-03-11 RX ADMIN — POTASSIUM CHLORIDE SCH: 1500 TABLET, EXTENDED RELEASE ORAL at 09:13

## 2018-03-11 NOTE — PDINTPN
Intensivist Progress Note


Assessment/Plan: 


Assessment:





Alcohol withdrawal.  On the new CIWA protocol.  Requiring Precedex, scheduled 

Ativan, p.r.n. Versed and Haldol.  On thiamin.





Recent fall, spinal compression fracture at L5.  Neurosurgery following.  Brace 

supposed to be worn when he is up.





COPD:  Secondary to ongoing tobacco abuse.  No evidence of an exacerbation.  On 

nicotine patch, oxygen and p.r.n. Bronchodilators.





Spinal stenosis, chronic back pain.





Prophylaxis:  On enoxaparin and famotidine.





Metabolic:  No issues identified.  On replacement protocols.





Nutrition:  None currently.  Unsafe swallow secondary to mental status.  Start 

oral intake when possible.








Plan:  Supportive care in the intensive care unit will be maintained.  Continue 

CIWA protocol.  Wean Precedex as tolerated.  Follow laboratory.  Continue IV 

morphine intermittently as needed for pain.








25 min of critical care time was spent directly with the patient.  Discussed 

with nursing, hospitalist, the ICU multi disciplinary team.














Subjective: 





Agitated and confused, unresponsive to commands.  Looks to voice.


Objective: 





 Vital Signs











Temp Pulse Resp BP Pulse Ox


 


 38.2 C   65   31 H  141/75 H  93 


 


 03/11/18 12:00  03/11/18 14:00  03/11/18 14:00  03/11/18 14:00  03/11/18 14:00








 Laboratory Results





 03/11/18 05:00 





 03/11/18 05:00 





 











 03/10/18 03/11/18 03/12/18





 04:59 05:59 05:59


 


Intake Total   


 


Output Total   


 


Balance   








 











PT  13.5 SEC (12.0-15.0)   03/08/18  22:00    


 


INR  1.01  (0.83-1.16)   03/08/18  22:00    











 Laboratory Tests











  03/11/18





  05:00


 


Phosphorus  4.7 H


 


Magnesium  1.9














Physical Exam





- Physical Exam


General Appearance: mild distress, thin, other (Restless, confused), No alert


EENT: PERRL/EOMI, other (Nasal cannula in place at 4 L)


Neck: normal inspection (No JVD)


Respiratory: lungs clear, decreased breath sounds (At bases), No rales, No 

rhonchi


Cardiac/Chest: regular rate, rhythm, systolic murmur


Abdomen: non-tender, soft, No normal bowel sounds (Decreased, present)


Male Genitalia: other (No Starr catheter)


Skin: warm/dry, pallor


Extremities: pedal edema


Neuro/Psych: no motor/sensory deficits (Moves all extremities equally), 

cognition abnormalities (Secondary to withdrawal.), No oriented x 3





ICD10 Worksheet


Patient Problems: 


 Problems











Problem Status Onset


 


Pneumonia Acute  


 


Polysubstance dependence including opioid drug with daily use Acute  


 


Altered mental status Acute  


 


Opiate dependence, continuous Acute  


 


Acute encephalopathy Acute  


 


Aspiration into airway Acute  


 


Polypharmacy Acute  


 


Acute hyperactive alcohol withdrawal delirium Acute  


 


Palliative care encounter Acute  


 


Rib fractures Acute  


 


Chronic pain Acute  


 


Pancreatitis Acute  


 


Dehydration Acute  


 


History of fall Acute  


 


Pneumonia Acute  


 


Thoracic compression fracture Acute  


 


Polysubstance abuse Acute  


 


Generalized weakness Acute  


 


Failure to thrive Acute  


 


Recurrent falls Acute  


 


Multiple fractures of ribs of right side Acute  


 


Accidental fall from chair Acute  


 


Closed right clavicular fracture Acute  


 


Hypoxia Acute  


 


Low back pain Acute  


 


Bilateral leg weakness Acute

## 2018-03-11 NOTE — HOSPPROG
Hospitalist Progress Note


Assessment/Plan: 





#Fever: new today. CXR for aspiration. UA negative. Blood cultures. APAP PRN





#Alchohol w/d: precedex, scheduled Ativan





#Acute on chronic back pain: L5 compression fracture, severe spinal stenosis L4/

5. No e/o bony lesions with h/o prostate cancer. NSGY reviewed. Brace when out 

of bed





#Falls: related to Etoh





#Torsades-appearing telemetry: Mg, K at goal





#Acute toxic encephalopathy: due to w/d





#HTN: not on outpatient meds





#COPD: no signs of exacerbation





#Bradycardia: related to Precedex, wean today





#Chronic hypoxemic resp failure: 3L at baseline





#Chronic pain: MS contin at home. Give low-dose IV here since not taking PO





#Diet: NPO for now, IVFs. If less agitated tomorrow, may need feeding tube





#DVT ppx: Lovenox





#Disp: warrants ICU care with etoh w/d, requiring CIWA


Subjective: agitated again this morning


Objective: 


 Vital Signs











Temp Pulse Resp BP Pulse Ox


 


 36.6 C   64   29 H  149/86 H  95 


 


 03/11/18 08:00  03/11/18 09:00  03/11/18 09:00  03/11/18 09:00  03/11/18 09:00








 Laboratory Results





 03/11/18 05:00 





 03/11/18 05:00 





 











 03/10/18 03/11/18 03/12/18





 04:59 05:59 05:59


 


Intake Total   


 


Output Total   


 


Balance   








 











PT  13.5 SEC (12.0-15.0)   03/08/18  22:00    


 


INR  1.01  (0.83-1.16)   03/08/18  22:00    














- Physical Exam


Constitutional: unkempt, cachectic


Eyes: PERRL


Ears, Nose, Mouth, Throat: dry mucous membranes


Cardiovascular: regular rate and rhythym


Gastrointestinal: normoactive bowel sounds, soft, non-tender abdomen


Skin: warm


Musculoskeletal: other (restrained. Not cooperative with exam)


Neurologic: CN II-XII Intact


Psychiatric: encephalopathic





ICD10 Worksheet


Patient Problems: 


 Problems











Problem Status Onset


 


Bilateral leg weakness Acute  


 


Low back pain Acute  


 


Accidental fall from chair Acute  


 


Acute encephalopathy Acute  


 


Acute hyperactive alcohol withdrawal delirium Acute  


 


Altered mental status Acute  


 


Aspiration into airway Acute  


 


Chronic pain Acute  


 


Closed right clavicular fracture Acute  


 


Dehydration Acute  


 


Failure to thrive Acute  


 


Generalized weakness Acute  


 


History of fall Acute  


 


Hypoxia Acute  


 


Multiple fractures of ribs of right side Acute  


 


Opiate dependence, continuous Acute  


 


Palliative care encounter Acute  


 


Pancreatitis Acute  


 


Pneumonia Acute  


 


Pneumonia Acute  


 


Polypharmacy Acute  


 


Polysubstance abuse Acute  


 


Polysubstance dependence including opioid drug with daily use Acute  


 


Recurrent falls Acute  


 


Rib fractures Acute  


 


Thoracic compression fracture Acute

## 2018-03-11 NOTE — NEUSURGPN
Assessment/Plan: 





A: 73 yo M with hx of ETOH abuse, admitted with acute L5 fx after fall 1 week 

ago. Has hx of COPD, chronic pain, smoker.





P:


-Transferred to ICU for CIWA protocol, on precedex/ativan/haldol


-Imaging reviewed and no evidence of bony metastasis, has prior hx of prostate 

cancer


-MRI L spine with severe spinal stenosis at L5/S1 with acute L5 fx


-Recommend brace when OOB - to be fitted by . Have not been able to try 

this given ETOH withdrawal/agitation


-Reviewed plan with pt's RN


-Please call NS with any questions or concerns


Subjective: 


Unable to obtain


Objective: 


Awake in bed, trying to get out


restraints BUE 


MAEx4


Not following commands


Speech unintelligible


Urinary Catheter in Place: No





Neurosurgery Physical Exam





- Vitals, I&O, Labs





 I and O











 03/10/18 03/11/18 03/12/18





 04:59 05:59 05:59


 


Intake Total   


 


Output Total   


 


Balance   


 


Weight   57 kg


 


Intake:   


 


  Oral (ml)   


 


  IV Intake (ml)   


 


  IV Infused (ml)   


 


    Dexmedetomidine HCl 400   





    mcg In Ns 100 ml @   





    Titrate IV CONT GREGG Rx#:   





    [id number]   


 


Output:   


 


  Urine (ml)   


 


    Toilet   


 


    Urinal   


 


Other:   


 


  Intake Quantity   





  Sufficient   


 


  Number of Voids   


 


    Incontinence   


 


    Toilet   


 


  Number of Stools   


 


    Incontinence   


 


    Toilet   








 Vital Signs











Temp Pulse Resp BP Pulse Ox


 


 36.6 C   66   26 H  145/78 H  94 


 


 03/11/18 08:00  03/11/18 08:00  03/11/18 08:00  03/11/18 08:00  03/11/18 08:00








 Laboratory Results





 03/11/18 05:00 





 03/11/18 05:00 











ICD10 Worksheet


Patient Problems: 


 Problems











Problem Status Onset


 


Bilateral leg weakness Acute  


 


Low back pain Acute  


 


Accidental fall from chair Acute  


 


Acute encephalopathy Acute  


 


Acute hyperactive alcohol withdrawal delirium Acute  


 


Altered mental status Acute  


 


Aspiration into airway Acute  


 


Chronic pain Acute  


 


Closed right clavicular fracture Acute  


 


Dehydration Acute  


 


Failure to thrive Acute  


 


Generalized weakness Acute  


 


History of fall Acute  


 


Hypoxia Acute  


 


Multiple fractures of ribs of right side Acute  


 


Opiate dependence, continuous Acute  


 


Palliative care encounter Acute  


 


Pancreatitis Acute  


 


Pneumonia Acute  


 


Pneumonia Acute  


 


Polypharmacy Acute  


 


Polysubstance abuse Acute  


 


Polysubstance dependence including opioid drug with daily use Acute  


 


Recurrent falls Acute  


 


Rib fractures Acute  


 


Thoracic compression fracture Acute

## 2018-03-12 LAB — PLATELET # BLD: 204 10^3/UL (ref 150–400)

## 2018-03-12 RX ADMIN — SODIUM CHLORIDE SCH MLS: 900 INJECTION, SOLUTION INTRAVENOUS at 21:00

## 2018-03-12 RX ADMIN — SODIUM CHLORIDE SCH MLS: 900 INJECTION, SOLUTION INTRAVENOUS at 11:18

## 2018-03-12 RX ADMIN — MIDAZOLAM PRN MG: 1 INJECTION INTRAMUSCULAR; INTRAVENOUS at 18:20

## 2018-03-12 RX ADMIN — ACETAMINOPHEN PRN MG: 650 SUPPOSITORY RECTAL at 22:57

## 2018-03-12 RX ADMIN — HALOPERIDOL LACTATE PRN MG: 5 INJECTION, SOLUTION INTRAMUSCULAR at 08:54

## 2018-03-12 RX ADMIN — Medication SCH MLS: at 21:00

## 2018-03-12 RX ADMIN — HALOPERIDOL LACTATE PRN MG: 5 INJECTION, SOLUTION INTRAMUSCULAR at 18:18

## 2018-03-12 RX ADMIN — ACETAMINOPHEN PRN MG: 650 SUPPOSITORY RECTAL at 18:22

## 2018-03-12 RX ADMIN — HALOPERIDOL LACTATE PRN MG: 5 INJECTION, SOLUTION INTRAMUSCULAR at 01:53

## 2018-03-12 RX ADMIN — SODIUM CHLORIDE SCH MLS: 900 INJECTION, SOLUTION INTRAVENOUS at 02:00

## 2018-03-12 RX ADMIN — DEXMEDETOMIDINE HYDROCHLORIDE SCH MLS: 100 INJECTION, SOLUTION, CONCENTRATE INTRAVENOUS at 23:42

## 2018-03-12 RX ADMIN — DEXMEDETOMIDINE HYDROCHLORIDE SCH MLS: 100 INJECTION, SOLUTION, CONCENTRATE INTRAVENOUS at 15:08

## 2018-03-12 RX ADMIN — SODIUM CHLORIDE SCH MLS: 900 INJECTION, SOLUTION INTRAVENOUS at 20:02

## 2018-03-12 RX ADMIN — Medication SCH MLS: at 08:40

## 2018-03-12 RX ADMIN — THERA TABS SCH: TAB at 08:36

## 2018-03-12 RX ADMIN — DEXTROSE MONOHYDRATE SCH MLS: 5 INJECTION, SOLUTION INTRAVENOUS at 14:32

## 2018-03-12 RX ADMIN — IPRATROPIUM BROMIDE AND ALBUTEROL SULFATE PRN ML: .5; 3 SOLUTION RESPIRATORY (INHALATION) at 16:54

## 2018-03-12 RX ADMIN — MIDAZOLAM PRN MG: 1 INJECTION INTRAMUSCULAR; INTRAVENOUS at 16:49

## 2018-03-12 RX ADMIN — MIDAZOLAM PRN MG: 1 INJECTION INTRAMUSCULAR; INTRAVENOUS at 08:25

## 2018-03-12 RX ADMIN — TAMSULOSIN HYDROCHLORIDE SCH: 0.4 CAPSULE ORAL at 08:36

## 2018-03-12 RX ADMIN — SODIUM CHLORIDE SCH MLS: 900 INJECTION, SOLUTION INTRAVENOUS at 18:54

## 2018-03-12 RX ADMIN — DEXMEDETOMIDINE HYDROCHLORIDE SCH MLS: 100 INJECTION, SOLUTION, CONCENTRATE INTRAVENOUS at 10:45

## 2018-03-12 RX ADMIN — MIDAZOLAM PRN MG: 1 INJECTION INTRAMUSCULAR; INTRAVENOUS at 11:24

## 2018-03-12 RX ADMIN — SODIUM CHLORIDE SCH MLS: 900 INJECTION, SOLUTION INTRAVENOUS at 01:53

## 2018-03-12 RX ADMIN — ENOXAPARIN SODIUM SCH MG: 100 INJECTION SUBCUTANEOUS at 08:33

## 2018-03-12 RX ADMIN — ACETAMINOPHEN PRN MG: 650 SUPPOSITORY RECTAL at 08:46

## 2018-03-12 RX ADMIN — DEXMEDETOMIDINE HYDROCHLORIDE SCH MLS: 100 INJECTION, SOLUTION, CONCENTRATE INTRAVENOUS at 03:00

## 2018-03-12 RX ADMIN — MIDAZOLAM PRN MG: 1 INJECTION INTRAMUSCULAR; INTRAVENOUS at 00:35

## 2018-03-12 RX ADMIN — SODIUM CHLORIDE SCH MLS: 900 INJECTION, SOLUTION INTRAVENOUS at 12:41

## 2018-03-12 RX ADMIN — SODIUM CHLORIDE SCH MLS: 900 INJECTION, SOLUTION INTRAVENOUS at 13:51

## 2018-03-12 RX ADMIN — MIDAZOLAM PRN MG: 1 INJECTION INTRAMUSCULAR; INTRAVENOUS at 08:42

## 2018-03-12 RX ADMIN — MIDAZOLAM PRN MG: 1 INJECTION INTRAMUSCULAR; INTRAVENOUS at 01:00

## 2018-03-12 RX ADMIN — IPRATROPIUM BROMIDE AND ALBUTEROL SULFATE PRN ML: .5; 3 SOLUTION RESPIRATORY (INHALATION) at 02:02

## 2018-03-12 RX ADMIN — POTASSIUM CHLORIDE SCH: 1500 TABLET, EXTENDED RELEASE ORAL at 08:36

## 2018-03-12 RX ADMIN — IPRATROPIUM BROMIDE AND ALBUTEROL SULFATE PRN ML: .5; 3 SOLUTION RESPIRATORY (INHALATION) at 10:46

## 2018-03-12 RX ADMIN — DEXMEDETOMIDINE HYDROCHLORIDE SCH MLS: 100 INJECTION, SOLUTION, CONCENTRATE INTRAVENOUS at 21:00

## 2018-03-12 RX ADMIN — MIDAZOLAM PRN MG: 1 INJECTION INTRAMUSCULAR; INTRAVENOUS at 04:39

## 2018-03-12 RX ADMIN — MIDAZOLAM PRN MG: 1 INJECTION INTRAMUSCULAR; INTRAVENOUS at 13:36

## 2018-03-12 RX ADMIN — CITALOPRAM HYDROBROMIDE SCH: 20 TABLET ORAL at 08:35

## 2018-03-12 RX ADMIN — MIDAZOLAM PRN MG: 1 INJECTION INTRAMUSCULAR; INTRAVENOUS at 04:45

## 2018-03-12 RX ADMIN — MIDAZOLAM PRN MG: 1 INJECTION INTRAMUSCULAR; INTRAVENOUS at 15:23

## 2018-03-12 RX ADMIN — HALOPERIDOL LACTATE PRN MG: 5 INJECTION, SOLUTION INTRAMUSCULAR at 22:57

## 2018-03-12 RX ADMIN — ERTAPENEM SODIUM SCH GM: 1 INJECTION, POWDER, LYOPHILIZED, FOR SOLUTION INTRAMUSCULAR; INTRAVENOUS at 12:29

## 2018-03-12 RX ADMIN — HALOPERIDOL LACTATE PRN MG: 5 INJECTION, SOLUTION INTRAMUSCULAR at 13:30

## 2018-03-12 RX ADMIN — FOLIC ACID SCH: 1 TABLET ORAL at 08:35

## 2018-03-12 NOTE — ASMTCMCOM
CM Note

 

CM Note                       

Notes:

Patient continues to be confused, lethargic not safe to swallow. May have aspirated. CIWA= 13 

. [facility] [place] [phone]. Patient had been a client of [facility] but was 

discharged from their care in December.

 

Date Signed:  03/12/2018 04:52 PM

Electronically Signed By:Becky Loo LCSW

## 2018-03-12 NOTE — NEUSURGPN
Assessment/Plan: 


A: 73 yo M with hx of ETOH abuse, admitted with acute L5 fx after fall 1 week 

ago. Has hx of COPD, chronic pain, smoker.





Plan:


-Patient on CIWA protocol, on precedex/ativan/haldol


-Imaging reviewed and no evidence of bony metastasis, has prior hx of prostate 

cancer


-MRI L spine with severe spinal stenosis at L5/S1 with acute L5 fx


-Recommend brace when OOB - to be fitted by . Have not been able to try 

this given ETOH withdrawal/agitation


-Please call NS with any questions or concerns


-Dr Mendoza saw patient as well 








Subjective: 


Unable to obtain, agitated in bed


Objective: 





Restless


Not following commands, patient on CIWA protocol


restraints BUE 


MAEx4, trying to get out of bed 


Speech unintelligible


Neuro Check Frequency: per routine 


Urinary Catheter in Place: Yes


Urinary Catheter Indication: Accurate I & O Required





- Physician


Discussed Patient with Dr.: Mendzoa


Patient Seen by Dr.: Mendoza





Neurosurgery Physical Exam





- Vitals, I&O, Labs





 I and O











 03/11/18 03/12/18 03/13/18





 05:59 05:59 05:59


 


Intake Total  2640 


 


Output Total  4 


 


Balance  2636 


 


Weight  57 kg 57 kg


 


Intake:   


 


  Oral (ml)  0 


 


  IV Intake (ml)  1200 


 


  IV Infused (ml)  1440 


 


    Dexmedetomidine HCl 400  231 





    mcg In Ns 100 ml @   





    Titrate IV CONT GREGG Rx#:   





    [id number]   


 


    Ns 1,000 ml @ 100 mls/hr  1209 





    IV CONT GREGG Rx#:   





    [id number]   


 


Output:   


 


  Urine (ml)  4 


 


    Incontinence  4 


 


    Toilet   


 


    Urinal   


 


Other:   


 


  Number of Voids   


 


    Incontinence  5 


 


  Number of Stools   


 


    Incontinence  0 








 Vital Signs











Temp Pulse Resp BP Pulse Ox


 


 37.7 C   63   29 H  147/75 H  90 L


 


 03/12/18 06:07  03/12/18 06:07  03/12/18 06:07  03/12/18 06:07  03/12/18 06:07








 Laboratory Results





 03/12/18 04:45 





 03/12/18 04:45 











ICD10 Worksheet


Patient Problems: 


 Problems











Problem Status Onset


 


Bilateral leg weakness Acute  


 


Low back pain Acute  


 


Accidental fall from chair Acute  


 


Acute encephalopathy Acute  


 


Acute hyperactive alcohol withdrawal delirium Acute  


 


Altered mental status Acute  


 


Aspiration into airway Acute  


 


Chronic pain Acute  


 


Closed right clavicular fracture Acute  


 


Dehydration Acute  


 


Failure to thrive Acute  


 


Generalized weakness Acute  


 


History of fall Acute  


 


Hypoxia Acute  


 


Multiple fractures of ribs of right side Acute  


 


Opiate dependence, continuous Acute  


 


Palliative care encounter Acute  


 


Pancreatitis Acute  


 


Pneumonia Acute  


 


Pneumonia Acute  


 


Polypharmacy Acute  


 


Polysubstance abuse Acute  


 


Polysubstance dependence including opioid drug with daily use Acute  


 


Recurrent falls Acute  


 


Rib fractures Acute  


 


Thoracic compression fracture Acute

## 2018-03-12 NOTE — HOSPPROG
Hospitalist Progress Note


Assessment/Plan: 





#Fever: suspect aspiration. Sputum cultures pending. UA NL.  Blood cultures 

pending





#Alchohol w/d: precedex, scheduled Ativan. Chronic pain may be contributing to 

agitation. Increase morphine





#Hypernatremia: D5W, repeat BMP





#Leukocytosis: suspect aspiration PNA. Plan as above





#Acute on chronic back pain: L5 compression fracture, severe spinal stenosis L4/

5. No e/o bony lesions with h/o prostate cancer. NSGY reviewed. Brace when out 

of bed





#Falls: related to Etoh





#Torsades-appearing telemetry: 3/11. Replete K, Mg





#Acute toxic encephalopathy: due to w/d





#HTN: not on outpatient meds





#COPD: no signs of exacerbation





#Bradycardia: related to Precedex, wean today





#Chronic hypoxemic resp failure: 3L at baseline





#Chronic pain: MS contin at home. 





#Diet: NPO for now, IVFs. If not mentally clear, will need feeding tuns





#DVT ppx: Lovenox





#Disp: warrants ICU care with etoh w/d, requiring CIWA


Subjective: still agitated


Objective: 


 Vital Signs











Temp Pulse Resp BP Pulse Ox


 


 38.8 C H  73   48 H  160/87 H  100 


 


 03/12/18 08:00  03/12/18 10:49  03/12/18 10:49  03/12/18 08:00  03/12/18 10:49








 Laboratory Results





 03/12/18 04:45 





 03/12/18 04:45 





 











 03/11/18 03/12/18 03/13/18





 05:59 05:59 05:59


 


Intake Total  2640 


 


Output Total  4 


 


Balance  2636 








 











PT  13.5 SEC (12.0-15.0)   03/08/18  22:00    


 


INR  1.01  (0.83-1.16)   03/08/18  22:00    














- Physical Exam


Constitutional: unkempt, cachectic, other (diaphoretic)


Eyes: PERRL


Ears, Nose, Mouth, Throat: poor dentition


Cardiovascular: regular rate and rhythym


Respiratory: rhonchi


Gastrointestinal: normoactive bowel sounds


Genitourinary: no bladder fullness


Neurologic: CN II-XII Intact, other


Psychiatric: encephalopathic





ICD10 Worksheet


Patient Problems: 


 Problems











Problem Status Onset


 


Bilateral leg weakness Acute  


 


Low back pain Acute  


 


Accidental fall from chair Acute  


 


Acute encephalopathy Acute  


 


Acute hyperactive alcohol withdrawal delirium Acute  


 


Altered mental status Acute  


 


Aspiration into airway Acute  


 


Chronic pain Acute  


 


Closed right clavicular fracture Acute  


 


Dehydration Acute  


 


Failure to thrive Acute  


 


Generalized weakness Acute  


 


History of fall Acute  


 


Hypoxia Acute  


 


Multiple fractures of ribs of right side Acute  


 


Opiate dependence, continuous Acute  


 


Palliative care encounter Acute  


 


Pancreatitis Acute  


 


Pneumonia Acute  


 


Pneumonia Acute  


 


Polypharmacy Acute  


 


Polysubstance abuse Acute  


 


Polysubstance dependence including opioid drug with daily use Acute  


 


Recurrent falls Acute  


 


Rib fractures Acute  


 


Thoracic compression fracture Acute

## 2018-03-13 LAB — PLATELET # BLD: 170 10^3/UL (ref 150–400)

## 2018-03-13 RX ADMIN — TAMSULOSIN HYDROCHLORIDE SCH: 0.4 CAPSULE ORAL at 10:03

## 2018-03-13 RX ADMIN — IPRATROPIUM BROMIDE AND ALBUTEROL SULFATE PRN ML: .5; 3 SOLUTION RESPIRATORY (INHALATION) at 23:33

## 2018-03-13 RX ADMIN — SODIUM CHLORIDE SCH MLS: 900 INJECTION, SOLUTION INTRAVENOUS at 07:34

## 2018-03-13 RX ADMIN — Medication SCH MLS: at 22:01

## 2018-03-13 RX ADMIN — SODIUM CHLORIDE SCH MLS: 900 INJECTION, SOLUTION INTRAVENOUS at 09:44

## 2018-03-13 RX ADMIN — SODIUM CHLORIDE SCH MLS: 900 INJECTION, SOLUTION INTRAVENOUS at 23:12

## 2018-03-13 RX ADMIN — DEXTROSE MONOHYDRATE SCH MLS: 5 INJECTION, SOLUTION INTRAVENOUS at 05:29

## 2018-03-13 RX ADMIN — IPRATROPIUM BROMIDE AND ALBUTEROL SULFATE PRN ML: .5; 3 SOLUTION RESPIRATORY (INHALATION) at 11:30

## 2018-03-13 RX ADMIN — CITALOPRAM HYDROBROMIDE SCH: 20 TABLET ORAL at 10:02

## 2018-03-13 RX ADMIN — POTASSIUM CHLORIDE SCH: 1500 TABLET, EXTENDED RELEASE ORAL at 10:02

## 2018-03-13 RX ADMIN — THIAMINE HYDROCHLORIDE SCH MLS: 100 INJECTION, SOLUTION INTRAMUSCULAR; INTRAVENOUS at 03:17

## 2018-03-13 RX ADMIN — DEXTROSE MONOHYDRATE AND SODIUM CHLORIDE SCH MLS: 5; .45 INJECTION, SOLUTION INTRAVENOUS at 15:40

## 2018-03-13 RX ADMIN — ENOXAPARIN SODIUM SCH MG: 100 INJECTION SUBCUTANEOUS at 08:58

## 2018-03-13 RX ADMIN — NICOTINE SCH MG: 21 PATCH, EXTENDED RELEASE TOPICAL at 15:19

## 2018-03-13 RX ADMIN — Medication SCH MLS: at 08:59

## 2018-03-13 RX ADMIN — SODIUM CHLORIDE SCH MLS: 900 INJECTION, SOLUTION INTRAVENOUS at 20:20

## 2018-03-13 RX ADMIN — ACETYLCYSTEINE SCH ML: 100 INHALANT RESPIRATORY (INHALATION) at 15:49

## 2018-03-13 RX ADMIN — SODIUM CHLORIDE SCH MLS: 900 INJECTION, SOLUTION INTRAVENOUS at 21:57

## 2018-03-13 RX ADMIN — DEXMEDETOMIDINE HYDROCHLORIDE SCH MLS: 100 INJECTION, SOLUTION, CONCENTRATE INTRAVENOUS at 05:29

## 2018-03-13 RX ADMIN — IPRATROPIUM BROMIDE AND ALBUTEROL SULFATE PRN ML: .5; 3 SOLUTION RESPIRATORY (INHALATION) at 15:50

## 2018-03-13 RX ADMIN — ACETYLCYSTEINE SCH: 100 INHALANT RESPIRATORY (INHALATION) at 13:36

## 2018-03-13 RX ADMIN — THERA TABS SCH: TAB at 10:02

## 2018-03-13 RX ADMIN — DEXMEDETOMIDINE HYDROCHLORIDE SCH MLS: 100 INJECTION, SOLUTION, CONCENTRATE INTRAVENOUS at 12:49

## 2018-03-13 RX ADMIN — SODIUM CHLORIDE SCH MLS: 900 INJECTION, SOLUTION INTRAVENOUS at 08:37

## 2018-03-13 RX ADMIN — FOLIC ACID SCH: 1 TABLET ORAL at 10:02

## 2018-03-13 RX ADMIN — DEXMEDETOMIDINE HYDROCHLORIDE SCH MLS: 100 INJECTION, SOLUTION, CONCENTRATE INTRAVENOUS at 20:04

## 2018-03-13 RX ADMIN — ACETYLCYSTEINE SCH ML: 100 INHALANT RESPIRATORY (INHALATION) at 23:33

## 2018-03-13 RX ADMIN — ERTAPENEM SODIUM SCH GM: 1 INJECTION, POWDER, LYOPHILIZED, FOR SOLUTION INTRAMUSCULAR; INTRAVENOUS at 08:59

## 2018-03-13 RX ADMIN — VANCOMYCIN HYDROCHLORIDE SCH MLS: 500 INJECTION, POWDER, LYOPHILIZED, FOR SOLUTION INTRAVENOUS at 15:40

## 2018-03-13 NOTE — NEUSURGPN
Assessment/Plan: 


Assessment: 73 yo M with hx of ETOH abuse, admitted with acute L5 fx after fall 

1 week ago. Has hx of COPD, chronic pain, smoker





Plan:


-Patient on CIWA protocol, on precedex/ativan/haldol


-Imaging reviewed and no evidence of bony metastasis, has prior hx of prostate 

cancer


-MRI L spine with severe spinal stenosis at L5/S1 with acute L5 fx


-Recommend brace when OOB - to be fitted by -have not been able to try 

this given ETOH withdrawal/agitation


-PT/OT on hold due to ETOH withdrawl


-Please call NS with any questions or concerns


-Dr Mendoza saw patient as well


-updated from RN


Subjective: 


No new events overnight.  Pt is withdraw with DT's.  


Objective: 


Restless


Not following commands, patient on CIWA protocol


restraints BUE 


MAEx4, trying to get out of bed 


Speech unintelligible


opens eyes to verbal and painful stimuli


Neuro Check Frequency: per routine


Urinary Catheter in Place: No


Catheter Insertion Date: 03/13/18





- Physician


Discussed Patient with Dr.: Mendoza


Patient Seen by Dr.: Mendoza





Neurosurgery Physical Exam





- Vitals, I&O, Labs





 I and O











 03/12/18 03/13/18 03/14/18





 05:59 05:59 05:59


 


Intake Total 2640 3847 


 


Output Total 4 600 


 


Balance 2636 3247 


 


Weight 57 kg 57 kg 57.7 kg


 


Intake:   


 


  Oral (ml) 0  


 


  IV Intake (ml) 1200 950 


 


  IV Infused (ml) 1440 2897 


 


    D5w 1,000 ml @ 100 mls/hr  2140 





    IV CONT GREGG Rx#:   





    [id number]   


 


    Dexmedetomidine HCl 400 231 757 





    mcg In Ns 100 ml @   





    Titrate IV CONT GREGG Rx#:   





    [id number]   


 


    Ns 1,000 ml @ 100 mls/hr 1209  





    IV CONT GREGG Rx#:   





    [id number]   


 


Output:   


 


  Urine (ml) 4 600 


 


    Catheter  600 


 


    Incontinence 4  


 


Other:   


 


  Number of Voids   


 


    Incontinence 5 2 


 


  Number of Stools   


 


    Catheter  0 


 


    Incontinence 0  








 Microbiology











 03/12/18 11:15  - Final





 Sputum, Induced/Suctioned 








 Vital Signs











Temp Pulse Resp BP Pulse Ox


 


 36.7 C   52 L  24 H  136/65 H  94 


 


 03/13/18 04:00  03/13/18 06:33  03/13/18 06:00  03/13/18 06:33  03/13/18 06:00








 Laboratory Results





 03/12/18 04:45 





 03/13/18 05:00 











ICD10 Worksheet


Patient Problems: 


 Problems











Problem Status Onset


 


Bilateral leg weakness Acute  


 


Low back pain Acute  


 


Accidental fall from chair Acute  


 


Acute encephalopathy Acute  


 


Acute hyperactive alcohol withdrawal delirium Acute  


 


Altered mental status Acute  


 


Aspiration into airway Acute  


 


Chronic pain Acute  


 


Closed right clavicular fracture Acute  


 


Dehydration Acute  


 


Failure to thrive Acute  


 


Generalized weakness Acute  


 


History of fall Acute  


 


Hypoxia Acute  


 


Multiple fractures of ribs of right side Acute  


 


Opiate dependence, continuous Acute  


 


Palliative care encounter Acute  


 


Pancreatitis Acute  


 


Pneumonia Acute  


 


Pneumonia Acute  


 


Polypharmacy Acute  


 


Polysubstance abuse Acute  


 


Polysubstance dependence including opioid drug with daily use Acute  


 


Recurrent falls Acute  


 


Rib fractures Acute  


 


Thoracic compression fracture Acute

## 2018-03-13 NOTE — PDINTPN
Intensivist Progress Note


Assessment/Plan: 


Assessment/plan:


* Alcohol withdrawal.  On the new CIWA protocol.  Requiring Precedex, scheduled 

Ativan, p.r.n. Versed and Haldol.  On thiamin.


      -improved


* Recent fall, spinal compression fracture at L5.  Neurosurgery following.  

Brace supposed to be worn when he is up.





* COPD:  Secondary to ongoing tobacco abuse.  No evidence of an exacerbation.  

On nicotine patch, oxygen and p.r.n. Bronchodilators.





* Pneumonia-possible aspiration


      -will send a sputum culture


      -continue


* Spinal stenosis, chronic back pain.





* Prophylaxis:  On enoxaparin and famotidine.





* Metabolic:  No issues identified.  On replacement protocols.





* Nutrition:  None currently.  Unsafe swallow secondary to mental status.  

Start oral intake when possible.














Subjective: 





Resting comfortably.


Objective: 





 Vital Signs











Temp Pulse Resp BP Pulse Ox


 


 36.7 C   52 L  24 H  136/65 H  94 


 


 03/13/18 04:00  03/13/18 06:33  03/13/18 06:00  03/13/18 06:33  03/13/18 06:00








 Microbiology











 03/12/18 11:15  - Final





 Sputum, Induced/Suctioned 








 Laboratory Results





 03/13/18 08:50 





 03/13/18 05:00 





 











 03/12/18 03/13/18 03/14/18





 05:59 05:59 05:59


 


Intake Total 2640 3847 


 


Output Total 4 600 


 


Balance 2636 3247 








 











PT  13.5 SEC (12.0-15.0)   03/08/18  22:00    


 


INR  1.01  (0.83-1.16)   03/08/18  22:00    














- Time Spent With Patient


Time Spent With Patient: 





35 min of time spent with patient, over 1/2 involved with coordination of care 

counseling





Physical Exam





- Physical Exam


General Appearance: alert, no apparent distress


EENT: PERRL/EOMI


Neck: non-tender, full range of motion, supple, normal inspection


Respiratory: crackles (Few), No respiratory distress, No stridor, No wheezing


Cardiac/Chest: normal peripheral pulses, regular rate, rhythm


Peripheral Pulses: 2+: carotid (R), carotid (L), femoral (R), femoral (L), 

dorsalis-pedis (R), dorsalis-pedis (L)


Abdomen: normal bowel sounds, non-tender, soft


Male Genitalia: deferred


Rectal: deferred


Skin: normal color, warm/dry


Extremities: non-tender





ICD10 Worksheet


Patient Problems: 


 Problems











Problem Status Onset


 


Bilateral leg weakness Acute  


 


Low back pain Acute  


 


Accidental fall from chair Acute  


 


Acute encephalopathy Acute  


 


Acute hyperactive alcohol withdrawal delirium Acute  


 


Altered mental status Acute  


 


Aspiration into airway Acute  


 


Chronic pain Acute  


 


Closed right clavicular fracture Acute  


 


Dehydration Acute  


 


Failure to thrive Acute  


 


Generalized weakness Acute  


 


History of fall Acute  


 


Hypoxia Acute  


 


Multiple fractures of ribs of right side Acute  


 


Opiate dependence, continuous Acute  


 


Palliative care encounter Acute  


 


Pancreatitis Acute  


 


Pneumonia Acute  


 


Pneumonia Acute  


 


Polypharmacy Acute  


 


Polysubstance abuse Acute  


 


Polysubstance dependence including opioid drug with daily use Acute  


 


Recurrent falls Acute  


 


Rib fractures Acute  


 


Thoracic compression fracture Acute

## 2018-03-13 NOTE — HOSPPROG
Hospitalist Progress Note


Assessment/Plan: 





#Fever: aspiration PNA. Sputum positive for S. aureus. Add vancomycin. UA NL.  

Blood cultures pending





#Alchohol w/d: still requiring precedex, scheduled Ativan. Chronic pain may be 

contributing to agitation. Increase morphine





#Hypernatremia: D5W, repeat BMP





#Leukocytosis: suspect aspiration PNA. Plan as above





#Acute on chronic back pain: L5 compression fracture, severe spinal stenosis L4/

5. No e/o bony lesions with h/o prostate cancer. NSGY reviewed. Brace when out 

of bed





#Falls: related to Etoh





#Torsades-appearing telemetry: 3/11. Replete K, Mg





#Acute toxic encephalopathy: due to w/d





#HTN: not on outpatient meds





#COPD: no signs of exacerbation





#Bradycardia: related to Precedex, wean today





#Chronic hypoxemic resp failure: 3L at baseline





#Chronic pain: MS contin at home. 





#Diet: NPO for now, IVFs. Too agitated for feeding tube today





#DVT ppx: Lovenox





#Disp: warrants ICU care with etoh w/d, requiring CIWA


Subjective: still requiring Precedex


Objective: 


 Vital Signs











Temp Pulse Resp BP Pulse Ox


 


 36.7 C   72   30 H  142/76 H  98 


 


 03/13/18 08:00  03/13/18 14:00  03/13/18 14:00  03/13/18 14:00  03/13/18 14:00








 Microbiology











 03/12/18 11:15  - Final





 Sputum, Induced/Suctioned 








 Laboratory Results





 03/13/18 08:50 





 











 03/12/18 03/13/18 03/14/18





 05:59 05:59 05:59


 


Intake Total 2640 3847 


 


Output Total 4 600 125


 


Balance 2636 3247 -125








 











PT  13.5 SEC (12.0-15.0)   03/08/18  22:00    


 


INR  1.01  (0.83-1.16)   03/08/18  22:00    














- Physical Exam


Constitutional: cachectic


Eyes: PERRL


Ears, Nose, Mouth, Throat: dry mucous membranes


Cardiovascular: regular rate and rhythym


Respiratory: other


Gastrointestinal: normoactive bowel sounds, soft, non-tender abdomen


Genitourinary: no bladder fullness


Skin: warm


Musculoskeletal: other (restrained)


Psychiatric: encephalopathic, agitated





ICD10 Worksheet


Patient Problems: 


 Problems











Problem Status Onset


 


Bilateral leg weakness Acute  


 


Low back pain Acute  


 


Accidental fall from chair Acute  


 


Acute encephalopathy Acute  


 


Acute hyperactive alcohol withdrawal delirium Acute  


 


Altered mental status Acute  


 


Aspiration into airway Acute  


 


Chronic pain Acute  


 


Closed right clavicular fracture Acute  


 


Dehydration Acute  


 


Failure to thrive Acute  


 


Generalized weakness Acute  


 


History of fall Acute  


 


Hypoxia Acute  


 


Multiple fractures of ribs of right side Acute  


 


Opiate dependence, continuous Acute  


 


Palliative care encounter Acute  


 


Pancreatitis Acute  


 


Pneumonia Acute  


 


Pneumonia Acute  


 


Polypharmacy Acute  


 


Polysubstance abuse Acute  


 


Polysubstance dependence including opioid drug with daily use Acute  


 


Recurrent falls Acute  


 


Rib fractures Acute  


 


Thoracic compression fracture Acute

## 2018-03-14 VITALS
OXYGEN SATURATION: 90 % | SYSTOLIC BLOOD PRESSURE: 118 MMHG | HEART RATE: 108 BPM | RESPIRATION RATE: 32 BRPM | DIASTOLIC BLOOD PRESSURE: 65 MMHG

## 2018-03-14 VITALS — TEMPERATURE: 99.5 F

## 2018-03-14 LAB — PLATELET # BLD: 181 10^3/UL (ref 150–400)

## 2018-03-14 RX ADMIN — NICOTINE SCH MG: 21 PATCH, EXTENDED RELEASE TOPICAL at 09:40

## 2018-03-14 RX ADMIN — SODIUM CHLORIDE SCH MLS: 900 INJECTION, SOLUTION INTRAVENOUS at 08:24

## 2018-03-14 RX ADMIN — THIAMINE HYDROCHLORIDE SCH MLS: 100 INJECTION, SOLUTION INTRAMUSCULAR; INTRAVENOUS at 08:24

## 2018-03-14 RX ADMIN — FOLIC ACID SCH: 1 TABLET ORAL at 09:57

## 2018-03-14 RX ADMIN — DEXMEDETOMIDINE HYDROCHLORIDE SCH MLS: 100 INJECTION, SOLUTION, CONCENTRATE INTRAVENOUS at 01:58

## 2018-03-14 RX ADMIN — SODIUM CHLORIDE SCH MLS: 900 INJECTION, SOLUTION INTRAVENOUS at 08:23

## 2018-03-14 RX ADMIN — THERA TABS SCH: TAB at 09:58

## 2018-03-14 RX ADMIN — IPRATROPIUM BROMIDE AND ALBUTEROL SULFATE PRN ML: .5; 3 SOLUTION RESPIRATORY (INHALATION) at 05:43

## 2018-03-14 RX ADMIN — DEXMEDETOMIDINE HYDROCHLORIDE SCH MLS: 100 INJECTION, SOLUTION, CONCENTRATE INTRAVENOUS at 19:07

## 2018-03-14 RX ADMIN — ERTAPENEM SODIUM SCH GM: 1 INJECTION, POWDER, LYOPHILIZED, FOR SOLUTION INTRAMUSCULAR; INTRAVENOUS at 10:01

## 2018-03-14 RX ADMIN — SODIUM CHLORIDE SCH MLS: 900 INJECTION, SOLUTION INTRAVENOUS at 00:14

## 2018-03-14 RX ADMIN — ACETYLCYSTEINE SCH: 100 INHALANT RESPIRATORY (INHALATION) at 13:53

## 2018-03-14 RX ADMIN — ENOXAPARIN SODIUM SCH MG: 100 INJECTION SUBCUTANEOUS at 09:57

## 2018-03-14 RX ADMIN — DEXTROSE MONOHYDRATE AND SODIUM CHLORIDE SCH MLS: 5; .45 INJECTION, SOLUTION INTRAVENOUS at 15:16

## 2018-03-14 RX ADMIN — TAMSULOSIN HYDROCHLORIDE SCH: 0.4 CAPSULE ORAL at 09:58

## 2018-03-14 RX ADMIN — Medication SCH MLS: at 09:40

## 2018-03-14 RX ADMIN — CITALOPRAM HYDROBROMIDE SCH: 20 TABLET ORAL at 09:56

## 2018-03-14 RX ADMIN — ACETYLCYSTEINE SCH ML: 100 INHALANT RESPIRATORY (INHALATION) at 05:43

## 2018-03-14 RX ADMIN — POTASSIUM CHLORIDE SCH: 1500 TABLET, EXTENDED RELEASE ORAL at 09:58

## 2018-03-14 RX ADMIN — DEXMEDETOMIDINE HYDROCHLORIDE SCH MLS: 100 INJECTION, SOLUTION, CONCENTRATE INTRAVENOUS at 08:24

## 2018-03-14 RX ADMIN — VANCOMYCIN HYDROCHLORIDE SCH MLS: 500 INJECTION, POWDER, LYOPHILIZED, FOR SOLUTION INTRAVENOUS at 03:34

## 2018-03-14 NOTE — PDINTPN
Intensivist Progress Note


Assessment/Plan: 


Assessment/plan:


* Alcohol withdrawal.  On the new CIWA protocol.  Requiring Precedex, scheduled 

Ativan, p.r.n. Versed and Haldol.  On thiamin.


      -improved


* Recent fall, spinal compression fracture at L5.  Neurosurgery following.  

Brace supposed to be worn when he is up.





* COPD:  Secondary to ongoing tobacco abuse.  No evidence of an exacerbation.  

On nicotine patch, oxygen and p.r.n. Bronchodilators.





* Pneumonia-possible aspiration.  Growing Staph


      -continue current antibiotics


      -check chest x-ray


* Acute respiratory failure-secondary to pneumonia and chronic obstructive 

pulmonary disease.  High oxygen requirements at this time.  Patient wanted 

percent non-rebreather.





* Spinal stenosis, chronic back pain.





* Prophylaxis:  On enoxaparin and famotidine.





* Metabolic:  No issues identified.  On replacement protocols.





* Nutrition:  None currently.  Unsafe swallow secondary to mental status.  

Start oral intake when possible.





* Do not resuscitate





* Prognosis-overall patient not improving.








Subjective: 





Poorly responsive.


Objective: 





 Vital Signs











Temp Pulse Resp BP Pulse Ox


 


 37.5 C   67   32 H  133/68 H  99 


 


 03/14/18 07:49  03/14/18 07:49  03/14/18 07:49  03/14/18 07:49  03/14/18 07:49








 Microbiology











 03/12/18 11:15  - Final





 Sputum, Induced/Suctioned 








 Laboratory Results





 03/14/18 04:30 





 03/14/18 04:30 





 











 03/13/18 03/14/18 03/15/18





 05:59 05:59 05:59


 


Intake Total 3847 3046 


 


Output Total 600 2675 


 


Balance 3247 371 








 











PT  13.5 SEC (12.0-15.0)   03/08/18  22:00    


 


INR  1.01  (0.83-1.16)   03/08/18  22:00    











 Laboratory Results





 03/14/18 04:30 





 03/14/18 04:30 





 











 03/12/18 11:15  - Final





 Sputum, Induced/Suctioned Sputum Culture - Preliminary





      Staphylococcus Aureus











- Time Spent With Patient


Time Spent With Patient: 





35 min of time spent with patient, over 1/2 involved with coordination of care 

or counseling.


Case discussed with Nursing and Respiratory therapy





Physical Exam





- Physical Exam


General Appearance: No alert


EENT: PERRL/EOMI


Neck: non-tender, full range of motion, supple, normal inspection


Respiratory: crackles (Bibasilar), prolonged expiration, No wheezing


Cardiac/Chest: normal peripheral pulses, regular rate, rhythm


Peripheral Pulses: 2+: carotid (R), carotid (L), femoral (R), femoral (L), 

dorsalis-pedis (R), dorsalis-pedis (L)


Abdomen: normal bowel sounds, non-tender, soft


Male Genitalia: deferred


Rectal: deferred


Skin: normal color, warm/dry


Extremities: normal range of motion, non-tender, normal inspection, normal 

capillary refill


Neuro/Psych: No alert, No normal mood/affect, No oriented x 3





ICD10 Worksheet


Patient Problems: 


 Problems











Problem Status Onset


 


Bilateral leg weakness Acute  


 


Low back pain Acute  


 


Accidental fall from chair Acute  


 


Acute encephalopathy Acute  


 


Acute hyperactive alcohol withdrawal delirium Acute  


 


Altered mental status Acute  


 


Aspiration into airway Acute  


 


Chronic pain Acute  


 


Closed right clavicular fracture Acute  


 


Dehydration Acute  


 


Failure to thrive Acute  


 


Generalized weakness Acute  


 


History of fall Acute  


 


Hypoxia Acute  


 


Multiple fractures of ribs of right side Acute  


 


Opiate dependence, continuous Acute  


 


Palliative care encounter Acute  


 


Pancreatitis Acute  


 


Pneumonia Acute  


 


Pneumonia Acute  


 


Polypharmacy Acute  


 


Polysubstance abuse Acute  


 


Polysubstance dependence including opioid drug with daily use Acute  


 


Recurrent falls Acute  


 


Rib fractures Acute  


 


Thoracic compression fracture Acute

## 2018-03-14 NOTE — HOSPPROG
Hospitalist Progress Note


Assessment/Plan: 








#Fever: aspiration PNA. Sputum positive for S. aureus.


-cont Ertapenem


-Stop Vancomycin


-f/u cultures





#Acute Alchohol w/d: still requiring precedex, scheduled Ativan.





#Hypernatremia: resolved


-Cont D51/2NS





#Leukocytosis: suspect aspiration PNA. Plan as above





#Acute on chronic back pain: L5 compression fracture, severe spinal stenosis L4/

5. No e/o bony lesions with h/o prostate cancer. NSGY reviewed. Brace when out 

of bed





#Falls: related to Etoh





#Torsades-appearing telemetry: 3/11. Replete K, Mg





#Acute toxic encephalopathy: due to w/d





#HTN: not on outpatient meds





#COPD: no signs of exacerbation





#Bradycardia: related to Precedex, cont to wean





#Chronic hypoxemic resp failure: 3L at baseline





#Chronic pain: MS contin, Neurontin, Flexeril





#Tobacco abuse: nicotine replacement





#Diet: NPO, IVFs. Feeding tube.





#DVT ppx: Lovenox





#Disp: warrants ICU care with etoh w/d, requiring CIWA





Plan:


stop Vanco


Cont Ertapenem


Cont CIWA


Cont ICU care


Poor Prognosis





total CCT is 35 minutes





D/W ICU Team during team rounds. D/W pharmacist


Subjective: Still NPO. Leukocytosis is better.


Objective: 


 Vital Signs











Temp Pulse Resp BP Pulse Ox


 


 37 C   75   30 H  155/82 H  98 


 


 03/14/18 12:00  03/14/18 12:00  03/14/18 12:00  03/14/18 12:00  03/14/18 12:00








 Microbiology











 03/12/18 11:15  - Final





 Sputum, Induced/Suctioned Sputum Culture - Final





    Staphylococcus Aureus








 Laboratory Results





 03/14/18 04:30 





 03/14/18 04:30 





 











 03/13/18 03/14/18 03/15/18





 05:59 05:59 05:59


 


Intake Total 3847 3046 


 


Output Total 600 2675 


 


Balance 3247 371 








 











PT  13.5 SEC (12.0-15.0)   03/08/18  22:00    


 


INR  1.01  (0.83-1.16)   03/08/18  22:00    














- Physical Exam


Constitutional: no apparent distress


Eyes: PERRL


Ears, Nose, Mouth, Throat: moist mucous membranes


Cardiovascular: regular rate and rhythym, No edema


Respiratory: reduced air movement


Gastrointestinal: normoactive bowel sounds


Skin: warm


Neurologic: No AAOx3


Psychiatric: No interacting appropriately, No encephalopathic


Lymph, Heme, Immunologic: No petechiae





ICD10 Worksheet


Patient Problems: 


 Problems











Problem Status Onset


 


Bilateral leg weakness Acute  


 


Low back pain Acute  


 


Accidental fall from chair Acute  


 


Acute encephalopathy Acute  


 


Acute hyperactive alcohol withdrawal delirium Acute  


 


Altered mental status Acute  


 


Aspiration into airway Acute  


 


Chronic pain Acute  


 


Closed right clavicular fracture Acute  


 


Dehydration Acute  


 


Failure to thrive Acute  


 


Generalized weakness Acute  


 


History of fall Acute  


 


Hypoxia Acute  


 


Multiple fractures of ribs of right side Acute  


 


Opiate dependence, continuous Acute  


 


Palliative care encounter Acute  


 


Pancreatitis Acute  


 


Pneumonia Acute  


 


Pneumonia Acute  


 


Polypharmacy Acute  


 


Polysubstance abuse Acute  


 


Polysubstance dependence including opioid drug with daily use Acute  


 


Recurrent falls Acute  


 


Rib fractures Acute  


 


Thoracic compression fracture Acute

## 2018-03-14 NOTE — NEUSURGPN
Assessment/Plan: 


Assessment: 73 yo M with hx of ETOH abuse, admitted with acute L5 fx after fall 

1 week prior to admission. Has hx of COPD, chronic pain, smoker





Plan:


-Patient on CIWA protocol, on precedex/ativan/haldol


-pt with worsening pulmonary status-critical care and IM on board


-Imaging reviewed and no evidence of bony metastasis, has prior hx of prostate 

cancer


-MRI L spine with severe spinal stenosis at L5/S1 with acute L5 fx


-Recommend brace when OOB - to be fitted by -have not been able to try 

this given ETOH withdrawal/agitation


-PT/OT on hold due to ETOH withdrawl/pulmonary status


-Please call [facility] with any questions or concerns


-s/w Dr Mendoza


-updated from RN


Subjective: 


No new events overnight.  Pt with poor pulmonary status.  Update from RN


Objective: 


Restless


Not following commands, patient on CIWA protocol


restraints BUE 


MAEx4 spontaneously 


opens eyes to verbal and painful stimuli


Neuro Check Frequency: per routine


Urinary Catheter in Place: No


Catheter Insertion Date: 03/13/18





- Physician


Discussed Patient with Dr.: Mendoza





Neurosurgery Physical Exam





- Vitals, I&O, Labs





 I and O











 03/13/18 03/14/18 03/15/18





 05:59 05:59 05:59


 


Intake Total 3847 3046 


 


Output Total 600 2675 


 


Balance 3247 371 


 


Weight 57 kg 60.9 kg 60.9 kg


 


Intake:   


 


  IV Intake (ml) 950 1546 


 


  IV Infused (ml) 2897 1500 


 


    D5w 1,000 ml @ 75 mls/hr 2140  





    IV CONT GREGG Rx#:   





    [id number]   


 


    D5w 1/2 Ns 1,000 ml @ 75  1098 





    mls/hr IV CONT GREGG Rx#:   





    [id number]   


 


    Dexmedetomidine HCl 400 757 402 





    mcg In Ns 100 ml @   





    Titrate IV CONT GREGG Rx#:   





    [id number]   


 


Output:   


 


  Urine (ml) 600 2675 


 


    Catheter 600 2675 


 


Other:   


 


  Output Comment   


 


    Catheter  pt pulled matson 


 


  Number of Voids   


 


    Incontinence 2  


 


  Number of Stools   


 


    Catheter 0  








 Microbiology











 03/12/18 11:15  - Final





 Sputum, Induced/Suctioned 








 Vital Signs











Temp Pulse Resp BP Pulse Ox


 


 36.6 C   78   29 H  134/67 H  98 


 


 03/14/18 04:00  03/14/18 06:00  03/14/18 06:00  03/14/18 06:00  03/14/18 06:00








 Laboratory Results





 03/14/18 04:30 





 03/14/18 04:30 











ICD10 Worksheet


Patient Problems: 


 Problems











Problem Status Onset


 


Bilateral leg weakness Acute  


 


Low back pain Acute  


 


Accidental fall from chair Acute  


 


Acute encephalopathy Acute  


 


Acute hyperactive alcohol withdrawal delirium Acute  


 


Altered mental status Acute  


 


Aspiration into airway Acute  


 


Chronic pain Acute  


 


Closed right clavicular fracture Acute  


 


Dehydration Acute  


 


Failure to thrive Acute  


 


Generalized weakness Acute  


 


History of fall Acute  


 


Hypoxia Acute  


 


Multiple fractures of ribs of right side Acute  


 


Opiate dependence, continuous Acute  


 


Palliative care encounter Acute  


 


Pancreatitis Acute  


 


Pneumonia Acute  


 


Pneumonia Acute  


 


Polypharmacy Acute  


 


Polysubstance abuse Acute  


 


Polysubstance dependence including opioid drug with daily use Acute  


 


Recurrent falls Acute  


 


Rib fractures Acute  


 


Thoracic compression fracture Acute

## 2018-03-14 NOTE — ASMTCMCOM
CM Note

 

CM Note                       

Notes:

Patient has specific instructions listed in living will and [facility] papers in the front of his chart 

and has had [facility] in the past. [facility], called [facility] [place], and left a 

generic message to check in with him for patient needs for family meeting or palliative care 

consult. D/C plan TBD. CM will follow.

 

Date Signed:  03/14/2018 12:12 PM

Electronically Signed By:Courtney Purcell LCSW

## 2018-07-01 NOTE — PDDCSUM
Discharge Summary


Discharge Summary: 





Dates of service 3/1-3/2/17





Discharge dx: 


# LLL PNA


# cognitive deficits/dementia


# falll from bed


# alcohol abuse and dependency


# hx of chronic narcotic and benzo dependency





Procedures/consulations: none





Hospital course by problem: 


# LLL pna: mild and without significant sxs, treated with levofloxacin and dc 

to complete course 


# dementia: appears relatively advanced in terms of his memory deficits, 

resides in assisted living currently and may be headed towards higher level of 

care in the near future but not currently interested


# etoh abuse: does not plan to quit, given etoh in house





Dc to assisted living


Meds SEE EHR


>35 min spent in dc more than half in coordination of care
normal (ped)...